# Patient Record
Sex: MALE | Race: WHITE | NOT HISPANIC OR LATINO
[De-identification: names, ages, dates, MRNs, and addresses within clinical notes are randomized per-mention and may not be internally consistent; named-entity substitution may affect disease eponyms.]

---

## 2018-02-02 ENCOUNTER — TRANSCRIPTION ENCOUNTER (OUTPATIENT)
Age: 58
End: 2018-02-02

## 2018-08-14 ENCOUNTER — APPOINTMENT (OUTPATIENT)
Dept: UROLOGY | Facility: CLINIC | Age: 58
End: 2018-08-14

## 2019-05-03 ENCOUNTER — RESULT REVIEW (OUTPATIENT)
Age: 59
End: 2019-05-03

## 2019-05-03 ENCOUNTER — APPOINTMENT (OUTPATIENT)
Dept: ULTRASOUND IMAGING | Facility: IMAGING CENTER | Age: 59
End: 2019-05-03
Payer: COMMERCIAL

## 2019-05-03 ENCOUNTER — OUTPATIENT (OUTPATIENT)
Dept: OUTPATIENT SERVICES | Facility: HOSPITAL | Age: 59
LOS: 1 days | End: 2019-05-03
Payer: COMMERCIAL

## 2019-05-03 DIAGNOSIS — Z00.8 ENCOUNTER FOR OTHER GENERAL EXAMINATION: ICD-10-CM

## 2019-05-03 PROCEDURE — 10006 FNA BX W/US GDN EA ADDL: CPT

## 2019-05-03 PROCEDURE — 88172 CYTP DX EVAL FNA 1ST EA SITE: CPT

## 2019-05-03 PROCEDURE — 10005 FNA BX W/US GDN 1ST LES: CPT

## 2019-05-03 PROCEDURE — 88173 CYTOPATH EVAL FNA REPORT: CPT

## 2019-05-03 PROCEDURE — 88173 CYTOPATH EVAL FNA REPORT: CPT | Mod: 26

## 2019-05-07 ENCOUNTER — APPOINTMENT (OUTPATIENT)
Dept: OTOLARYNGOLOGY | Facility: CLINIC | Age: 59
End: 2019-05-07

## 2020-04-27 ENCOUNTER — APPOINTMENT (OUTPATIENT)
Dept: PULMONOLOGY | Facility: CLINIC | Age: 60
End: 2020-04-27
Payer: COMMERCIAL

## 2020-04-27 PROCEDURE — 99213 OFFICE O/P EST LOW 20 MIN: CPT | Mod: 95

## 2020-04-27 NOTE — ASSESSMENT
[FreeTextEntry1] : Need to verify patient's ongoing compliance-have asked patient to mail compliance card.\par It would appear he needs a new CPAP machine which he qualifies for regardless of whether the existing machine is nonfunctional or not as his current machine is quite old\par He should not require a new sleep study at this point but I will arrange one if his insurance insists upon it. I can provide him with a loaner if necessary once I have verified his compliance and pressure requirement

## 2020-04-27 NOTE — REASON FOR VISIT
[Follow-Up] : a follow-up visit [Sleep Apnea] : sleep apnea [Home] : at home, [unfilled] , at the time of the visit. [Medical Office: ([facility])___] : at the medical office located in  [Patient] : the patient [Self] : self

## 2020-11-27 DIAGNOSIS — Z01.818 ENCOUNTER FOR OTHER PREPROCEDURAL EXAMINATION: ICD-10-CM

## 2020-12-01 ENCOUNTER — APPOINTMENT (OUTPATIENT)
Dept: DISASTER EMERGENCY | Facility: CLINIC | Age: 60
End: 2020-12-01

## 2020-12-02 ENCOUNTER — OUTPATIENT (OUTPATIENT)
Dept: OUTPATIENT SERVICES | Facility: HOSPITAL | Age: 60
LOS: 1 days | Discharge: HOME | End: 2020-12-02
Payer: COMMERCIAL

## 2020-12-02 VITALS
HEART RATE: 79 BPM | SYSTOLIC BLOOD PRESSURE: 138 MMHG | RESPIRATION RATE: 16 BRPM | DIASTOLIC BLOOD PRESSURE: 61 MMHG | OXYGEN SATURATION: 99 %

## 2020-12-02 VITALS
SYSTOLIC BLOOD PRESSURE: 146 MMHG | HEIGHT: 71 IN | HEART RATE: 81 BPM | DIASTOLIC BLOOD PRESSURE: 72 MMHG | RESPIRATION RATE: 17 BRPM | TEMPERATURE: 99 F | OXYGEN SATURATION: 97 % | WEIGHT: 250 LBS

## 2020-12-02 DIAGNOSIS — R94.39 ABNORMAL RESULT OF OTHER CARDIOVASCULAR FUNCTION STUDY: ICD-10-CM

## 2020-12-02 DIAGNOSIS — Z98.890 OTHER SPECIFIED POSTPROCEDURAL STATES: Chronic | ICD-10-CM

## 2020-12-02 DIAGNOSIS — Z98.1 ARTHRODESIS STATUS: Chronic | ICD-10-CM

## 2020-12-02 LAB
ANION GAP SERPL CALC-SCNC: 12 MMOL/L — SIGNIFICANT CHANGE UP (ref 5–17)
BUN SERPL-MCNC: 16 MG/DL — SIGNIFICANT CHANGE UP (ref 7–23)
CALCIUM SERPL-MCNC: 9.5 MG/DL — SIGNIFICANT CHANGE UP (ref 8.4–10.5)
CHLORIDE SERPL-SCNC: 103 MMOL/L — SIGNIFICANT CHANGE UP (ref 96–108)
CO2 SERPL-SCNC: 24 MMOL/L — SIGNIFICANT CHANGE UP (ref 22–31)
CREAT SERPL-MCNC: 0.92 MG/DL — SIGNIFICANT CHANGE UP (ref 0.5–1.3)
GLUCOSE SERPL-MCNC: 176 MG/DL — HIGH (ref 70–99)
HCT VFR BLD CALC: 35.5 % — LOW (ref 39–50)
HGB BLD-MCNC: 12.1 G/DL — LOW (ref 13–17)
MCHC RBC-ENTMCNC: 30.3 PG — SIGNIFICANT CHANGE UP (ref 27–34)
MCHC RBC-ENTMCNC: 34.1 GM/DL — SIGNIFICANT CHANGE UP (ref 32–36)
MCV RBC AUTO: 88.8 FL — SIGNIFICANT CHANGE UP (ref 80–100)
NRBC # BLD: 0 /100 WBCS — SIGNIFICANT CHANGE UP (ref 0–0)
PLATELET # BLD AUTO: 170 K/UL — SIGNIFICANT CHANGE UP (ref 150–400)
POTASSIUM SERPL-MCNC: 4.4 MMOL/L — SIGNIFICANT CHANGE UP (ref 3.5–5.3)
POTASSIUM SERPL-SCNC: 4.4 MMOL/L — SIGNIFICANT CHANGE UP (ref 3.5–5.3)
RBC # BLD: 4 M/UL — LOW (ref 4.2–5.8)
RBC # FLD: 12.8 % — SIGNIFICANT CHANGE UP (ref 10.3–14.5)
SARS-COV-2 N GENE NPH QL NAA+PROBE: NOT DETECTED
SODIUM SERPL-SCNC: 139 MMOL/L — SIGNIFICANT CHANGE UP (ref 135–145)
WBC # BLD: 5.7 K/UL — SIGNIFICANT CHANGE UP (ref 3.8–10.5)
WBC # FLD AUTO: 5.7 K/UL — SIGNIFICANT CHANGE UP (ref 3.8–10.5)

## 2020-12-02 PROCEDURE — 93567 NJX CAR CTH SPRVLV AORTGRPHY: CPT

## 2020-12-02 PROCEDURE — 93458 L HRT ARTERY/VENTRICLE ANGIO: CPT

## 2020-12-02 PROCEDURE — C1894: CPT

## 2020-12-02 PROCEDURE — 99153 MOD SED SAME PHYS/QHP EA: CPT

## 2020-12-02 PROCEDURE — 85027 COMPLETE CBC AUTOMATED: CPT

## 2020-12-02 PROCEDURE — 99152 MOD SED SAME PHYS/QHP 5/>YRS: CPT

## 2020-12-02 PROCEDURE — C1769: CPT

## 2020-12-02 PROCEDURE — C1887: CPT

## 2020-12-02 PROCEDURE — 93005 ELECTROCARDIOGRAM TRACING: CPT

## 2020-12-02 PROCEDURE — 93010 ELECTROCARDIOGRAM REPORT: CPT

## 2020-12-02 PROCEDURE — 80048 BASIC METABOLIC PNL TOTAL CA: CPT

## 2020-12-02 PROCEDURE — 93458 L HRT ARTERY/VENTRICLE ANGIO: CPT | Mod: 26,59

## 2020-12-02 NOTE — ASU DISCHARGE PLAN (ADULT/PEDIATRIC) - CALL YOUR DOCTOR IF YOU HAVE ANY OF THE FOLLOWING:
Wound/Surgical Site with redness, or foul smelling discharge or pus/Bleeding that does not stop/Fever greater than (need to indicate Fahrenheit or Celsius)

## 2020-12-02 NOTE — ASU DISCHARGE PLAN (ADULT/PEDIATRIC) - ASU DC SPECIAL INSTRUCTIONSFT
No heavy lifting or pushing/pulling with procedure arm for 2 weeks. No driving for 2 days. You may shower 24 hours following the procedure but avoid baths/swimming for 1 week. Check your wrist site for bleeding and/or swelling daily following procedure and call your doctor immediately if it occurs or if you experience increased pain at the site. Follow up with your cardiologist in 1-2 weeks. You may call [facility] if you have any questions/concerns regarding your procedure [phone].

## 2020-12-02 NOTE — ASU DISCHARGE PLAN (ADULT/PEDIATRIC) - CARE PROVIDER_API CALL
Mathew Fermin J  CARDIOVASCULAR DISEASE  [id number] [address] [place] [address]  Phone: [phone]  Fax: [phone]  Follow Up Time:

## 2020-12-02 NOTE — H&P CARDIOLOGY - HISTORY OF PRESENT ILLNESS
60 y/o M with Pmxh of       11/16/20 ST: Defect in the inferior segment is reversible. EF 61%, hypokinesis in the inferior segments. Preserved left ventricular systolic function. 60 y/o M with Pmxh of borderline DM (diet controlled) sent her by Dr. Fermin for angiogram due to routine (+) ST done on his annual physical. Pt not on any medications. No known medical problems. Denies CP, SOB, dizziness, lightheadedness, or fevers      11/16/20 ST: Defect in the inferior segment is reversible. EF 61%, hypokinesis in the inferior segments. Preserved left ventricular systolic function.

## 2020-12-03 PROBLEM — R73.03 PREDIABETES: Chronic | Status: ACTIVE | Noted: 2020-12-02

## 2021-03-18 ENCOUNTER — TRANSCRIPTION ENCOUNTER (OUTPATIENT)
Age: 61
End: 2021-03-18

## 2022-01-22 ENCOUNTER — NON-APPOINTMENT (OUTPATIENT)
Age: 62
End: 2022-01-22

## 2022-01-26 ENCOUNTER — APPOINTMENT (OUTPATIENT)
Dept: UROLOGY | Facility: CLINIC | Age: 62
End: 2022-01-26
Payer: COMMERCIAL

## 2022-01-26 ENCOUNTER — NON-APPOINTMENT (OUTPATIENT)
Age: 62
End: 2022-01-26

## 2022-01-26 VITALS — TEMPERATURE: 96.4 F

## 2022-01-26 DIAGNOSIS — E11.9 TYPE 2 DIABETES MELLITUS W/OUT COMPLICATIONS: ICD-10-CM

## 2022-01-26 PROCEDURE — 99204 OFFICE O/P NEW MOD 45 MIN: CPT

## 2022-01-26 RX ORDER — TADALAFIL 10 MG/1
10 TABLET, FILM COATED ORAL
Qty: 10 | Refills: 0 | Status: ACTIVE | COMMUNITY
Start: 2022-01-26 | End: 1900-01-01

## 2022-01-26 NOTE — ASSESSMENT
[FreeTextEntry1] : 62 yo male with DM2 presenting for initial visit for erectile dysfunction. \par \par -- Discussed physiology of erections and pathophysiology of erectile dysfunction. \par -We had an extensive discussion regarding possible management options for erectile dysfunction, including PDE 5 inhibitors, BRAD, ICI, intraurethral alprostadil, penile prosthesis\par -After a thorough discussion of the risks and benefits of the aforementioned options he would like to try a trial of a PDE 5 inhibitor\par -- Trial if Cialis 10 mg\par I discussed the risks, benefits, alternatives, and possible side effects of Cialis (tadalafil) therapy with the patient, including but not limited to headache, flushing, upset stomach, blurry vision, change in color vision, vision loss, and priapism with the patient.\par -- RTC 1 month  \par

## 2022-01-26 NOTE — HISTORY OF PRESENT ILLNESS
[FreeTextEntry1] : Very pleasant 61 year old male with DM2 presenting for initial visit for erectile dysfunction. States he has not been able to have sexual intercourse with his wife for many years (10 years) and is now seeking evaluation because he is frustrated. Referred by Dr. Paz Costello. States he had DM2, was able to reverse with diet and exercise. However, December 2021 A1c 7, which is the highest it has been and states it was an outlier from the holidays. Denies CAD, PVD, smoking, SOB or claudication. He has normal libido and would like to engage in intercourse with his wife, but is unable to have any erection (0/10 strength). He is able to have orgasm and ejaculation. He is able to masturbate and has an erection with self stimulation. Denies nocturnal tumescence. Has not tried any PDE-5 inhibitors, his wife is concerned about side effects. He has has multiple spine surgeries but denies pain, weakness or numbness. \par \par PMH: DM2 controlled with diet and exercise \par PSHx: lumbar spinal fusions, lumbar microdiscectomy \par Denies medications\par Denies allergies \par Denies tobacco and alcohol

## 2022-01-26 NOTE — REVIEW OF SYSTEMS
[Poor quality erections] : Poor quality erections [No erections] : no erections [Negative] : Heme/Lymph [see HPI] : see HPI [FreeTextEntry4] : Erectile dysfunction

## 2022-02-15 RX ORDER — TADALAFIL 20 MG/1
20 TABLET ORAL
Qty: 10 | Refills: 0 | Status: ACTIVE | COMMUNITY
Start: 2022-02-15 | End: 1900-01-01

## 2022-02-17 ENCOUNTER — APPOINTMENT (OUTPATIENT)
Dept: UROLOGY | Facility: CLINIC | Age: 62
End: 2022-02-17

## 2022-03-01 ENCOUNTER — APPOINTMENT (OUTPATIENT)
Dept: UROLOGY | Facility: CLINIC | Age: 62
End: 2022-03-01
Payer: COMMERCIAL

## 2022-03-01 DIAGNOSIS — N52.9 MALE ERECTILE DYSFUNCTION, UNSPECIFIED: ICD-10-CM

## 2022-03-01 PROCEDURE — 99213 OFFICE O/P EST LOW 20 MIN: CPT

## 2022-03-01 NOTE — HISTORY OF PRESENT ILLNESS
[FreeTextEntry1] : Very pleasant 61-year-old gentleman who presents for follow-up of erectile dysfunction.  He reports an improvement in his erections with Cialis 20 mg.  Whereas before he reports no erections at all, after taking Cialis 20 mg he reports rigidity and tumescence of approximately 5 out of 10.  He reports that this is not strong enough for vaginal penetration, however he is encouraged by the improvement in his erections at this time.

## 2022-03-01 NOTE — ASSESSMENT
[FreeTextEntry1] : Very pleasant 61-year-old gentleman who presents for follow-up of erectile dysfunction\par -We had an extensive discussion regarding possible management options for erectile dysfunction, including PDE 5 inhibitors, BRAD, ICI, intraurethral alprostadil, penile prosthesis\par -After a thorough discussion of the risks and benefits of the aforementioned options he would like to continue Cialis 20 mg to try to give it more time to work\par -Follow-up in approximately 1 to 2 months.\par -At this time he is not interested in intracavernosal injections

## 2022-04-06 RX ORDER — SILDENAFIL 100 MG/1
100 TABLET, FILM COATED ORAL
Qty: 10 | Refills: 3 | Status: ACTIVE | COMMUNITY
Start: 2022-04-06 | End: 1900-01-01

## 2022-05-02 ENCOUNTER — APPOINTMENT (OUTPATIENT)
Dept: UROLOGY | Facility: CLINIC | Age: 62
End: 2022-05-02

## 2023-01-26 ENCOUNTER — APPOINTMENT (OUTPATIENT)
Dept: PULMONOLOGY | Facility: CLINIC | Age: 63
End: 2023-01-26
Payer: COMMERCIAL

## 2023-01-26 VITALS
WEIGHT: 246 LBS | DIASTOLIC BLOOD PRESSURE: 75 MMHG | BODY MASS INDEX: 35.22 KG/M2 | HEIGHT: 70 IN | OXYGEN SATURATION: 94 % | HEART RATE: 92 BPM | SYSTOLIC BLOOD PRESSURE: 144 MMHG

## 2023-01-26 PROCEDURE — 99213 OFFICE O/P EST LOW 20 MIN: CPT

## 2023-01-26 NOTE — ADDENDUM
[FreeTextEntry1] : I, Sekou Gonzalesdoe, acted solely as a scribe for Dr. Brenden Argueta M.D. on this date 01/26/2023. \par \par All medical record entries made by the Scribe were at my, Dr. Brenden Argueta M.D., direction and personally dictated by me on 01/26/2023. I have reviewed the chart and agree that the record accurately reflects my personal performance of the history, physical exam, assessment and plan. I have also personally directed, reviewed, and agreed with the chart.

## 2023-01-26 NOTE — ASSESSMENT
[FreeTextEntry1] : Mr. NICOLE DAVIS is an 62 year old male with Obstructive sleep apnea. Auto-titrating Positive Airway Pressure(APAP) compliance reviewed with patient in office today. Patient is compliant and benefiting from APAP usage. I have advised him to wait until after insurance switch to medicare when he turns 63 to receive a new home sleep study for an updated sleep apnea evaluation.

## 2023-01-26 NOTE — PROCEDURE
[FreeTextEntry1] : Compliance Report\par Usage 12/27/2022 - 01/25/2023\par Usage days 30/30 days (100%)\par >= 4 hours 30 days (100%)\par < 4 hours 0 days (0%)\par Usage hours 216 hours 55 minutes\par Average usage (total days) 7 hours 14 minutes\par Average usage (days used) 7 hours 14 minutes\par Median usage (days used) 7 hours 17 minutes\par S9 AutoSet\par Serial number [id number]\par Mode AutoSet\par Min Pressure 5 cmH2O\par Max Pressure 15 cmH2O\par EPR Fulltime\par EPR level 2\par Therapy\par Pressure - cmH2O Median: 6.6 95th percentile: 11.1 Maximum: 12.7\par Leaks - L/min Median: 0.0 95th percentile: 6.5 Maximum: 33.1\par Events per hour AI: 0.3 HI: 0.2 AHI: 0.5\par Apnea Index Central: 0.0 Obstructive: 0.2 Unknown: 0.1

## 2023-06-12 ENCOUNTER — INPATIENT (INPATIENT)
Facility: HOSPITAL | Age: 63
LOS: 0 days | Discharge: HOME | DRG: 563 | End: 2023-06-13
Attending: INTERNAL MEDICINE | Admitting: INTERNAL MEDICINE
Payer: COMMERCIAL

## 2023-06-12 VITALS
TEMPERATURE: 98 F | RESPIRATION RATE: 18 BRPM | OXYGEN SATURATION: 97 % | SYSTOLIC BLOOD PRESSURE: 122 MMHG | WEIGHT: 244.05 LBS | HEIGHT: 71 IN | HEART RATE: 90 BPM | DIASTOLIC BLOOD PRESSURE: 76 MMHG

## 2023-06-12 DIAGNOSIS — S82.009A UNSPECIFIED FRACTURE OF UNSPECIFIED PATELLA, INITIAL ENCOUNTER FOR CLOSED FRACTURE: ICD-10-CM

## 2023-06-12 DIAGNOSIS — Z98.1 ARTHRODESIS STATUS: Chronic | ICD-10-CM

## 2023-06-12 DIAGNOSIS — Z98.890 OTHER SPECIFIED POSTPROCEDURAL STATES: Chronic | ICD-10-CM

## 2023-06-12 LAB
ALBUMIN SERPL ELPH-MCNC: 4.4 G/DL — SIGNIFICANT CHANGE UP (ref 3.3–5)
ALP SERPL-CCNC: 86 U/L — SIGNIFICANT CHANGE UP (ref 40–120)
ALT FLD-CCNC: 42 U/L — SIGNIFICANT CHANGE UP (ref 10–45)
ANION GAP SERPL CALC-SCNC: 14 MMOL/L — SIGNIFICANT CHANGE UP (ref 5–17)
APTT BLD: 29 SEC — SIGNIFICANT CHANGE UP (ref 27.5–35.5)
AST SERPL-CCNC: 29 U/L — SIGNIFICANT CHANGE UP (ref 10–40)
BASOPHILS # BLD AUTO: 0.04 K/UL — SIGNIFICANT CHANGE UP (ref 0–0.2)
BASOPHILS NFR BLD AUTO: 0.6 % — SIGNIFICANT CHANGE UP (ref 0–2)
BILIRUB SERPL-MCNC: 0.4 MG/DL — SIGNIFICANT CHANGE UP (ref 0.2–1.2)
BUN SERPL-MCNC: 19 MG/DL — SIGNIFICANT CHANGE UP (ref 7–23)
CALCIUM SERPL-MCNC: 9.2 MG/DL — SIGNIFICANT CHANGE UP (ref 8.4–10.5)
CHLORIDE SERPL-SCNC: 99 MMOL/L — SIGNIFICANT CHANGE UP (ref 96–108)
CO2 SERPL-SCNC: 23 MMOL/L — SIGNIFICANT CHANGE UP (ref 22–31)
CREAT SERPL-MCNC: 0.7 MG/DL — SIGNIFICANT CHANGE UP (ref 0.5–1.3)
EGFR: 103 ML/MIN/1.73M2 — SIGNIFICANT CHANGE UP
EOSINOPHIL # BLD AUTO: 0.17 K/UL — SIGNIFICANT CHANGE UP (ref 0–0.5)
EOSINOPHIL NFR BLD AUTO: 2.6 % — SIGNIFICANT CHANGE UP (ref 0–6)
GLUCOSE SERPL-MCNC: 277 MG/DL — HIGH (ref 70–99)
HCT VFR BLD CALC: 35.6 % — LOW (ref 39–50)
HGB BLD-MCNC: 12.6 G/DL — LOW (ref 13–17)
IMM GRANULOCYTES NFR BLD AUTO: 0.3 % — SIGNIFICANT CHANGE UP (ref 0–0.9)
INR BLD: 1.09 RATIO — SIGNIFICANT CHANGE UP (ref 0.88–1.16)
LYMPHOCYTES # BLD AUTO: 1.7 K/UL — SIGNIFICANT CHANGE UP (ref 1–3.3)
LYMPHOCYTES # BLD AUTO: 26.2 % — SIGNIFICANT CHANGE UP (ref 13–44)
MCHC RBC-ENTMCNC: 30.2 PG — SIGNIFICANT CHANGE UP (ref 27–34)
MCHC RBC-ENTMCNC: 35.4 GM/DL — SIGNIFICANT CHANGE UP (ref 32–36)
MCV RBC AUTO: 85.4 FL — SIGNIFICANT CHANGE UP (ref 80–100)
MONOCYTES # BLD AUTO: 0.51 K/UL — SIGNIFICANT CHANGE UP (ref 0–0.9)
MONOCYTES NFR BLD AUTO: 7.9 % — SIGNIFICANT CHANGE UP (ref 2–14)
NEUTROPHILS # BLD AUTO: 4.04 K/UL — SIGNIFICANT CHANGE UP (ref 1.8–7.4)
NEUTROPHILS NFR BLD AUTO: 62.4 % — SIGNIFICANT CHANGE UP (ref 43–77)
NRBC # BLD: 0 /100 WBCS — SIGNIFICANT CHANGE UP (ref 0–0)
PLATELET # BLD AUTO: 171 K/UL — SIGNIFICANT CHANGE UP (ref 150–400)
POTASSIUM SERPL-MCNC: 4.2 MMOL/L — SIGNIFICANT CHANGE UP (ref 3.5–5.3)
POTASSIUM SERPL-SCNC: 4.2 MMOL/L — SIGNIFICANT CHANGE UP (ref 3.5–5.3)
PROT SERPL-MCNC: 7.5 G/DL — SIGNIFICANT CHANGE UP (ref 6–8.3)
PROTHROM AB SERPL-ACNC: 12.6 SEC — SIGNIFICANT CHANGE UP (ref 10.5–13.4)
RBC # BLD: 4.17 M/UL — LOW (ref 4.2–5.8)
RBC # FLD: 12.8 % — SIGNIFICANT CHANGE UP (ref 10.3–14.5)
SODIUM SERPL-SCNC: 136 MMOL/L — SIGNIFICANT CHANGE UP (ref 135–145)
WBC # BLD: 6.48 K/UL — SIGNIFICANT CHANGE UP (ref 3.8–10.5)
WBC # FLD AUTO: 6.48 K/UL — SIGNIFICANT CHANGE UP (ref 3.8–10.5)

## 2023-06-12 PROCEDURE — 73110 X-RAY EXAM OF WRIST: CPT | Mod: 26,LT

## 2023-06-12 PROCEDURE — 73130 X-RAY EXAM OF HAND: CPT | Mod: 26,LT

## 2023-06-12 PROCEDURE — 73564 X-RAY EXAM KNEE 4 OR MORE: CPT | Mod: 26,RT

## 2023-06-12 PROCEDURE — 73590 X-RAY EXAM OF LOWER LEG: CPT | Mod: 26,RT

## 2023-06-12 PROCEDURE — 73502 X-RAY EXAM HIP UNI 2-3 VIEWS: CPT | Mod: 26,RT

## 2023-06-12 PROCEDURE — 73552 X-RAY EXAM OF FEMUR 2/>: CPT | Mod: 26,RT

## 2023-06-12 PROCEDURE — 29125 APPL SHORT ARM SPLINT STATIC: CPT

## 2023-06-12 PROCEDURE — 99285 EMERGENCY DEPT VISIT HI MDM: CPT | Mod: 25

## 2023-06-12 RX ORDER — ACETAMINOPHEN 500 MG
1000 TABLET ORAL ONCE
Refills: 0 | Status: COMPLETED | OUTPATIENT
Start: 2023-06-12 | End: 2023-06-12

## 2023-06-12 RX ORDER — ACETAMINOPHEN 500 MG
975 TABLET ORAL ONCE
Refills: 0 | Status: COMPLETED | OUTPATIENT
Start: 2023-06-12 | End: 2023-06-12

## 2023-06-12 RX ORDER — TETANUS TOXOID, REDUCED DIPHTHERIA TOXOID AND ACELLULAR PERTUSSIS VACCINE, ADSORBED 5; 2.5; 8; 8; 2.5 [IU]/.5ML; [IU]/.5ML; UG/.5ML; UG/.5ML; UG/.5ML
0.5 SUSPENSION INTRAMUSCULAR ONCE
Refills: 0 | Status: COMPLETED | OUTPATIENT
Start: 2023-06-12 | End: 2023-06-12

## 2023-06-12 RX ORDER — IBUPROFEN 200 MG
600 TABLET ORAL ONCE
Refills: 0 | Status: COMPLETED | OUTPATIENT
Start: 2023-06-12 | End: 2023-06-12

## 2023-06-12 RX ADMIN — TETANUS TOXOID, REDUCED DIPHTHERIA TOXOID AND ACELLULAR PERTUSSIS VACCINE, ADSORBED 0.5 MILLILITER(S): 5; 2.5; 8; 8; 2.5 SUSPENSION INTRAMUSCULAR at 17:13

## 2023-06-12 RX ADMIN — Medication 400 MILLIGRAM(S): at 20:04

## 2023-06-12 RX ADMIN — Medication 600 MILLIGRAM(S): at 17:13

## 2023-06-12 NOTE — ED ADULT NURSE NOTE - NSFALLRISKINTERV_ED_ALL_ED

## 2023-06-12 NOTE — ED PROVIDER NOTE - ATTENDING APP SHARED VISIT CONTRIBUTION OF CARE
Attending MD Craft:   I personally have seen and examined this patient.  Physician assistant note reviewed and agree on plan of care and except where noted.  See below for details.     Seen in [place] accompanied by wife    62M with PMH/PSH including diet controlled DM, extensive lumbar surgeries presents to the ED with R knee and L wrist/hand pain s/p fall.  Reports was out for a walk as doctor recommended exercise and tripped and fell on uneven pavement.  Reports heard "crack" at knee at time of fall and since has been unable to bear weight.  Reports landed on R knee and L fall onto outstretched hand.  Denies preceding dizziness, weakness, sensory changes.  Denies LOC, hitting head.  Denies chest pain, shortness of breath, abdominal pain, nausea, vomiting, diarrhea, urinary complaints, change in vision, loss of vision.  Denies neck or back pain.  Denies loss of urinary or bowel continence. Denies numbness, weakness or tingling in extremities.     Exam:   General: NAD  HENT: head NCAT, airway patent  Eyes: anicteric, no conjunctival injection   Lungs: lungs CTAB with good inspiratory effort, no wheezing, no rhonchi, no rales  Cardiac: +S1S2, no obvious m/r/g  GI: abdomen soft with +BS, NT, ND  : no CVAT  MSK: ranging neck freely, UEs and LLEs freely, FROM at L shoulder, L elbow, +tenderness to palpation at L anatomical snuffbox and with axial loading of L thumb, +2 radials and DPs, R hip/ankle/foot nontender, able to fully range passively, +R knee edema with tenderness diffusely, small abrasion overlying, limited ROM secondary to pain, no midline tenderness to palpation of spine, well healed lower spinal surgical incision  Neuro: moving all extremities spontaneously, nonfocal  Psych: normal mood and affect     A/P: 62M with L wrist pain, concern for scaphoid injury, explained would need splint regardless of XR findings, R knee will evaluate for bony injury, will obtain XR RLE, will give analgesia, will reassess, may need CT

## 2023-06-12 NOTE — ED PROVIDER NOTE - PHYSICAL EXAMINATION
A&Ox3, NAD, well appearing  Extremities: R knee effusion with overlaying superficial abrasion overlaying patella, no FB or bleeding, unable to extend at R knee 2/2 pain. cap refill <2, pulses in distal extremities 4+, no edema.   + tt of the L thenar eminence and + snuff box ttp, + decreased  strength 2/2 pain in the hand, sensations intact throughout.   Skin without rash.   No focal Deficits A&Ox3, NAD, well appearing  NCAT. PERRL, EOMI.  Neck supple, no vertebral ttp of the c spine  Lungs CTAB. No w/r/r  Cardiac , RRR, No m/r/g.   Abd soft, NT/ND, no rebound or guarding.    Extremities: R knee effusion with overlaying superficial abrasion overlaying patella, no FB or bleeding, unable to extend at R knee 2/2 pain. cap refill <2, pulses in distal extremities 4+, no edema.   + tt of the L thenar eminence and + snuff box ttp, + decreased  strength 2/2 pain in the hand, sensations intact throughout.   Skin without rash.   No focal Deficits

## 2023-06-12 NOTE — ED PROVIDER NOTE - NS ED ATTENDING STATEMENT MOD
This was a shared visit with the SHERIE. I reviewed and verified the documentation and independently performed the documented:

## 2023-06-12 NOTE — ED ADULT TRIAGE NOTE - CHIEF COMPLAINT QUOTE
RLE pain and abrasion, reports mechanical fall while walking today on uneven sidewalk, denies hitting head

## 2023-06-12 NOTE — ED ADULT NURSE NOTE - OBJECTIVE STATEMENT
63yo M pmh T2DM presents to ED from home with wife present at the bedside, presenting with c/o R knee pain and swelling s/p mechanical fall this morning. pt reports tripping and falling over the sidewalk at 11:30 this morning while on a walk for exercise, fell onto his R knee and L hand. Pt denies head strike or LOC. pt now endorsing 10/10 pain to the R knee with associated difficulty ambulating d/t pain, as well as pain to the L wrist. pt denies any cp, sob, headache, numbness, tingling, nausea or vomiting. on exam in ED pt is generally well-appearing in NAD, awake and alert, A&Ox4, breathing even and unlabored, clear coherent speech, vital signs stable. + swelling noted to R knee, + abrasion to R anterior knee without active bleeding. pt seen and eval by ED MD and PA. pt medicated for pain per orders. pending X-Ray. Plan of care discussed.

## 2023-06-12 NOTE — ED PROVIDER NOTE - OBJECTIVE STATEMENT
62-year-old male with past medical history of diet-controlled diabetes, here for evaluation of right knee and left wrist pain after mechanical fall this afternoon.  Patient states he was out for a walk and tripped on a curb, fell onto directly onto his right knee and his left wrist.  Denies any head strike or loss of consciousness.  Was unable to bear weight on the right lower extremity after the event.  Did not take anything for pain afterwards.  Denies any numbness, tingling, paresthesias.

## 2023-06-12 NOTE — CONSULT NOTE ADULT - SUBJECTIVE AND OBJECTIVE BOX
62y Male presents with right knee pain s/p mechanical fall today. He was walking outside when he tripped and fell landing directly onto R flexed knee. Denies numbness/tingling in the affected extremity. Denies head strike/LOC. Reports L hand pain, Patient ambulates without assistance at baseline.     PAST MEDICAL & SURGICAL HISTORY:  Borderline diabetic  diet controlled      H/O spinal fusion  lumbar spinal fusion  spinal fixation device  discectomy      H/O discectomy        Home Medications:    Allergies    No Known Allergies    Intolerances                              12.6   6.48  )-----------( 171      ( 12 Jun 2023 18:15 )             35.6     06-12    136  |  99  |  19  ----------------------------<  277<H>  4.2   |  23  |  0.70    Ca    9.2      12 Jun 2023 18:15    TPro  7.5  /  Alb  4.4  /  TBili  0.4  /  DBili  x   /  AST  29  /  ALT  42  /  AlkPhos  86  06-12    PT/INR - ( 12 Jun 2023 18:15 )   PT: 12.6 sec;   INR: 1.09 ratio         PTT - ( 12 Jun 2023 18:15 )  PTT:29.0 sec        Vital Signs Last 24 Hrs  T(C): 36.7 (13 Jun 2023 01:41), Max: 37 (13 Jun 2023 00:26)  T(F): 98.1 (13 Jun 2023 01:41), Max: 98.6 (13 Jun 2023 00:26)  HR: 75 (13 Jun 2023 01:41) (75 - 90)  BP: 132/82 (13 Jun 2023 01:41) (122/76 - 142/78)  BP(mean): --  RR: 18 (13 Jun 2023 01:41) (17 - 18)  SpO2: 97% (13 Jun 2023 01:41) (95% - 97%)    Parameters below as of 13 Jun 2023 01:41  Patient On (Oxygen Delivery Method): room air        PHYSICAL EXAM  General: NAD, Awake and Alert    RLE:  Superficial abrasion above R patella. Skin otherwise intact  TTP over distal thigh and patella  NTTP tibial tubercle   NTTP over the bony prominences of the hip/ankle/foot/toes  L2-S1 SILT   Able to SLR  +EHL/FHL/TA/GSC  +DP pulses  Calf nontender  Compartments soft and compressible  Negative log roll, no tenderness w axial loading    LLE, BUE: No deformities, NTTP bony prominences, WWP distally,      IMAGING:  XR R knee: Comminuted fx of R patella        Assessment/Plan:  62y Male with R comminuted fx of patella    -NPO for possible procedure  -Pre-op labs  -Pain control as needed  -NWB RLE in bulky galeana knee immobilizer  -CT R knee for pre-op planning  -DVT ppx: Please hold all chemical dvt ppx for OR   -Needs medical optimization for OR, please document      For all questions related to patient care, please reach out via the on-call pager.    Shani Haider, PGY-1  Orthopedic Surgery  [facility]: [phone]  [facility]: [id number]  [facility]: [phone]

## 2023-06-12 NOTE — CONSULT NOTE ADULT - ATTENDING COMMENTS
Patient examined. Chart and X-rays reviewed. Agree with above note.    Alfonso Devries MD Patient examined. Chart and X-rays reviewed. Patient is able to actively extend the RLE against gravity. X-rays including CT images reveal evidence of comminution of Right patella fracture. The diastasis and step-off is less than 2-3 mm on all views. At this time my recommendation is non-operative treatment.  - No flexion at the Right knee joint.  - RLE Weinert knee brace locked at full extension.  - WBAT RLE.  - DVT prophylaxis with Xarelto or Eliquis.  - Orthopaedic office follow-up in 10-14 days. Call [phone] for appointment.  Alfonso Devries MD

## 2023-06-12 NOTE — ED PROCEDURE NOTE - PROCEDURE NAME, MLM
Splint [Dear  ___] : Dear  [unfilled], [Courtesy Letter:] : I had the pleasure of seeing your patient, [unfilled], in my office today. [Please see my note below.] : Please see my note below. [Consult Closing:] : Thank you very much for allowing me to participate in the care of this patient.  If you have any questions, please do not hesitate to contact me. [Sincerely,] : Sincerely, [FreeTextEntry3] : Brain Mederos MD\par Associate Professor of Medicine\par Chief of GI\par Director IBD Program\par [facility]\par

## 2023-06-13 ENCOUNTER — TRANSCRIPTION ENCOUNTER (OUTPATIENT)
Age: 63
End: 2023-06-13

## 2023-06-13 VITALS
RESPIRATION RATE: 18 BRPM | SYSTOLIC BLOOD PRESSURE: 126 MMHG | OXYGEN SATURATION: 94 % | HEART RATE: 64 BPM | DIASTOLIC BLOOD PRESSURE: 67 MMHG | TEMPERATURE: 98 F

## 2023-06-13 DIAGNOSIS — N52.9 MALE ERECTILE DYSFUNCTION, UNSPECIFIED: ICD-10-CM

## 2023-06-13 DIAGNOSIS — G47.33 OBSTRUCTIVE SLEEP APNEA (ADULT) (PEDIATRIC): ICD-10-CM

## 2023-06-13 DIAGNOSIS — M25.532 PAIN IN LEFT WRIST: ICD-10-CM

## 2023-06-13 DIAGNOSIS — E78.5 HYPERLIPIDEMIA, UNSPECIFIED: ICD-10-CM

## 2023-06-13 DIAGNOSIS — R73.03 PREDIABETES: ICD-10-CM

## 2023-06-13 DIAGNOSIS — I10 ESSENTIAL (PRIMARY) HYPERTENSION: ICD-10-CM
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
## 2023-06-13 DIAGNOSIS — S82.009A UNSPECIFIED FRACTURE OF UNSPECIFIED PATELLA, INITIAL ENCOUNTER FOR CLOSED FRACTURE: ICD-10-CM

## 2023-06-13 LAB
A1C WITH ESTIMATED AVERAGE GLUCOSE RESULT: 9.5 % — HIGH (ref 4–5.6)
ALBUMIN SERPL ELPH-MCNC: 4.5 G/DL — SIGNIFICANT CHANGE UP (ref 3.3–5)
ALP SERPL-CCNC: 87 U/L — SIGNIFICANT CHANGE UP (ref 40–120)
ALT FLD-CCNC: 40 U/L — SIGNIFICANT CHANGE UP (ref 10–45)
ANION GAP SERPL CALC-SCNC: 10 MMOL/L — SIGNIFICANT CHANGE UP (ref 5–17)
ANION GAP SERPL CALC-SCNC: 13 MMOL/L — SIGNIFICANT CHANGE UP (ref 5–17)
APTT BLD: 27.8 SEC — SIGNIFICANT CHANGE UP (ref 27.5–35.5)
APTT BLD: 28.5 SEC — SIGNIFICANT CHANGE UP (ref 27.5–35.5)
AST SERPL-CCNC: 23 U/L — SIGNIFICANT CHANGE UP (ref 10–40)
BASOPHILS # BLD AUTO: 0.05 K/UL — SIGNIFICANT CHANGE UP (ref 0–0.2)
BASOPHILS NFR BLD AUTO: 0.8 % — SIGNIFICANT CHANGE UP (ref 0–2)
BILIRUB SERPL-MCNC: 0.6 MG/DL — SIGNIFICANT CHANGE UP (ref 0.2–1.2)
BLD GP AB SCN SERPL QL: NEGATIVE — SIGNIFICANT CHANGE UP
BUN SERPL-MCNC: 15 MG/DL — SIGNIFICANT CHANGE UP (ref 7–23)
BUN SERPL-MCNC: 16 MG/DL — SIGNIFICANT CHANGE UP (ref 7–23)
CALCIUM SERPL-MCNC: 9 MG/DL — SIGNIFICANT CHANGE UP (ref 8.4–10.5)
CALCIUM SERPL-MCNC: 9.2 MG/DL — SIGNIFICANT CHANGE UP (ref 8.4–10.5)
CHLORIDE SERPL-SCNC: 98 MMOL/L — SIGNIFICANT CHANGE UP (ref 96–108)
CHLORIDE SERPL-SCNC: 98 MMOL/L — SIGNIFICANT CHANGE UP (ref 96–108)
CHOLEST SERPL-MCNC: 166 MG/DL — SIGNIFICANT CHANGE UP
CO2 SERPL-SCNC: 24 MMOL/L — SIGNIFICANT CHANGE UP (ref 22–31)
CO2 SERPL-SCNC: 26 MMOL/L — SIGNIFICANT CHANGE UP (ref 22–31)
CREAT SERPL-MCNC: 0.68 MG/DL — SIGNIFICANT CHANGE UP (ref 0.5–1.3)
CREAT SERPL-MCNC: 0.77 MG/DL — SIGNIFICANT CHANGE UP (ref 0.5–1.3)
D DIMER BLD IA.RAPID-MCNC: 431 NG/ML DDU — HIGH
EGFR: 100 ML/MIN/1.73M2 — SIGNIFICANT CHANGE UP
EGFR: 104 ML/MIN/1.73M2 — SIGNIFICANT CHANGE UP
EOSINOPHIL # BLD AUTO: 0.24 K/UL — SIGNIFICANT CHANGE UP (ref 0–0.5)
EOSINOPHIL NFR BLD AUTO: 3.8 % — SIGNIFICANT CHANGE UP (ref 0–6)
ESTIMATED AVERAGE GLUCOSE: 226 MG/DL — HIGH (ref 68–114)
FERRITIN SERPL-MCNC: 384 NG/ML — SIGNIFICANT CHANGE UP (ref 30–400)
FOLATE SERPL-MCNC: 13.7 NG/ML — SIGNIFICANT CHANGE UP
GLUCOSE BLDC GLUCOMTR-MCNC: 280 MG/DL — HIGH (ref 70–99)
GLUCOSE BLDC GLUCOMTR-MCNC: 304 MG/DL — HIGH (ref 70–99)
GLUCOSE SERPL-MCNC: 296 MG/DL — HIGH (ref 70–99)
GLUCOSE SERPL-MCNC: 296 MG/DL — HIGH (ref 70–99)
HAPTOGLOB SERPL-MCNC: 80 MG/DL — SIGNIFICANT CHANGE UP (ref 34–200)
HCT VFR BLD CALC: 35.4 % — LOW (ref 39–50)
HCT VFR BLD CALC: 36.5 % — LOW (ref 39–50)
HDLC SERPL-MCNC: 32 MG/DL — LOW
HGB BLD-MCNC: 12.3 G/DL — LOW (ref 13–17)
HGB BLD-MCNC: 12.6 G/DL — LOW (ref 13–17)
IMM GRANULOCYTES NFR BLD AUTO: 0.3 % — SIGNIFICANT CHANGE UP (ref 0–0.9)
INR BLD: 1.12 RATIO — SIGNIFICANT CHANGE UP (ref 0.88–1.16)
INR BLD: 1.16 RATIO — SIGNIFICANT CHANGE UP (ref 0.88–1.16)
IRON SATN MFR SERPL: 26 % — SIGNIFICANT CHANGE UP (ref 16–55)
IRON SATN MFR SERPL: 64 UG/DL — SIGNIFICANT CHANGE UP (ref 45–165)
LDH SERPL L TO P-CCNC: 231 U/L — SIGNIFICANT CHANGE UP (ref 50–242)
LIPID PNL WITH DIRECT LDL SERPL: 94 MG/DL — SIGNIFICANT CHANGE UP
LYMPHOCYTES # BLD AUTO: 1.42 K/UL — SIGNIFICANT CHANGE UP (ref 1–3.3)
LYMPHOCYTES # BLD AUTO: 22.8 % — SIGNIFICANT CHANGE UP (ref 13–44)
MAGNESIUM SERPL-MCNC: 1.9 MG/DL — SIGNIFICANT CHANGE UP (ref 1.6–2.6)
MCHC RBC-ENTMCNC: 29.4 PG — SIGNIFICANT CHANGE UP (ref 27–34)
MCHC RBC-ENTMCNC: 29.4 PG — SIGNIFICANT CHANGE UP (ref 27–34)
MCHC RBC-ENTMCNC: 34.5 GM/DL — SIGNIFICANT CHANGE UP (ref 32–36)
MCHC RBC-ENTMCNC: 34.7 GM/DL — SIGNIFICANT CHANGE UP (ref 32–36)
MCV RBC AUTO: 84.7 FL — SIGNIFICANT CHANGE UP (ref 80–100)
MCV RBC AUTO: 85.3 FL — SIGNIFICANT CHANGE UP (ref 80–100)
MONOCYTES # BLD AUTO: 0.58 K/UL — SIGNIFICANT CHANGE UP (ref 0–0.9)
MONOCYTES NFR BLD AUTO: 9.3 % — SIGNIFICANT CHANGE UP (ref 2–14)
NEUTROPHILS # BLD AUTO: 3.93 K/UL — SIGNIFICANT CHANGE UP (ref 1.8–7.4)
NEUTROPHILS NFR BLD AUTO: 63 % — SIGNIFICANT CHANGE UP (ref 43–77)
NON HDL CHOLESTEROL: 134 MG/DL — HIGH
NRBC # BLD: 0 /100 WBCS — SIGNIFICANT CHANGE UP (ref 0–0)
NRBC # BLD: 0 /100 WBCS — SIGNIFICANT CHANGE UP (ref 0–0)
NT-PROBNP SERPL-SCNC: <36 PG/ML — SIGNIFICANT CHANGE UP (ref 0–300)
PHOSPHATE SERPL-MCNC: 2.7 MG/DL — SIGNIFICANT CHANGE UP (ref 2.5–4.5)
PLATELET # BLD AUTO: 161 K/UL — SIGNIFICANT CHANGE UP (ref 150–400)
PLATELET # BLD AUTO: 169 K/UL — SIGNIFICANT CHANGE UP (ref 150–400)
POTASSIUM SERPL-MCNC: 4.1 MMOL/L — SIGNIFICANT CHANGE UP (ref 3.5–5.3)
POTASSIUM SERPL-MCNC: 4.1 MMOL/L — SIGNIFICANT CHANGE UP (ref 3.5–5.3)
POTASSIUM SERPL-SCNC: 4.1 MMOL/L — SIGNIFICANT CHANGE UP (ref 3.5–5.3)
POTASSIUM SERPL-SCNC: 4.1 MMOL/L — SIGNIFICANT CHANGE UP (ref 3.5–5.3)
PROT SERPL-MCNC: 7.3 G/DL — SIGNIFICANT CHANGE UP (ref 6–8.3)
PROTHROM AB SERPL-ACNC: 12.9 SEC — SIGNIFICANT CHANGE UP (ref 10.5–13.4)
PROTHROM AB SERPL-ACNC: 13.4 SEC — SIGNIFICANT CHANGE UP (ref 10.5–13.4)
RBC # BLD: 4.18 M/UL — LOW (ref 4.2–5.8)
RBC # BLD: 4.18 M/UL — LOW (ref 4.2–5.8)
RBC # BLD: 4.28 M/UL — SIGNIFICANT CHANGE UP (ref 4.2–5.8)
RBC # FLD: 12.9 % — SIGNIFICANT CHANGE UP (ref 10.3–14.5)
RBC # FLD: 12.9 % — SIGNIFICANT CHANGE UP (ref 10.3–14.5)
RETICS #: 63.1 K/UL — SIGNIFICANT CHANGE UP (ref 25–125)
RETICS/RBC NFR: 1.5 % — SIGNIFICANT CHANGE UP (ref 0.5–2.5)
RH IG SCN BLD-IMP: POSITIVE — SIGNIFICANT CHANGE UP
SODIUM SERPL-SCNC: 134 MMOL/L — LOW (ref 135–145)
SODIUM SERPL-SCNC: 135 MMOL/L — SIGNIFICANT CHANGE UP (ref 135–145)
TIBC SERPL-MCNC: 245 UG/DL — SIGNIFICANT CHANGE UP (ref 220–430)
TRANSFERRIN SERPL-MCNC: 208 MG/DL — SIGNIFICANT CHANGE UP (ref 200–360)
TRIGL SERPL-MCNC: 204 MG/DL — HIGH
TROPONIN T, HIGH SENSITIVITY RESULT: 43 NG/L — SIGNIFICANT CHANGE UP (ref 0–51)
TSH SERPL-MCNC: 2.46 UIU/ML — SIGNIFICANT CHANGE UP (ref 0.27–4.2)
UIBC SERPL-MCNC: 181 UG/DL — SIGNIFICANT CHANGE UP (ref 110–370)
VIT B12 SERPL-MCNC: 497 PG/ML — SIGNIFICANT CHANGE UP (ref 232–1245)
WBC # BLD: 6.16 K/UL — SIGNIFICANT CHANGE UP (ref 3.8–10.5)
WBC # BLD: 6.24 K/UL — SIGNIFICANT CHANGE UP (ref 3.8–10.5)
WBC # FLD AUTO: 6.16 K/UL — SIGNIFICANT CHANGE UP (ref 3.8–10.5)
WBC # FLD AUTO: 6.24 K/UL — SIGNIFICANT CHANGE UP (ref 3.8–10.5)

## 2023-06-13 PROCEDURE — 76377 3D RENDER W/INTRP POSTPROCES: CPT

## 2023-06-13 PROCEDURE — 80061 LIPID PANEL: CPT

## 2023-06-13 PROCEDURE — 84484 ASSAY OF TROPONIN QUANT: CPT

## 2023-06-13 PROCEDURE — 73590 X-RAY EXAM OF LOWER LEG: CPT

## 2023-06-13 PROCEDURE — 85025 COMPLETE CBC W/AUTO DIFF WBC: CPT

## 2023-06-13 PROCEDURE — 83615 LACTATE (LD) (LDH) ENZYME: CPT

## 2023-06-13 PROCEDURE — 82746 ASSAY OF FOLIC ACID SERUM: CPT

## 2023-06-13 PROCEDURE — 83735 ASSAY OF MAGNESIUM: CPT

## 2023-06-13 PROCEDURE — 86850 RBC ANTIBODY SCREEN: CPT

## 2023-06-13 PROCEDURE — 71045 X-RAY EXAM CHEST 1 VIEW: CPT | Mod: 26

## 2023-06-13 PROCEDURE — 83540 ASSAY OF IRON: CPT

## 2023-06-13 PROCEDURE — 84443 ASSAY THYROID STIM HORMONE: CPT

## 2023-06-13 PROCEDURE — 93306 TTE W/DOPPLER COMPLETE: CPT

## 2023-06-13 PROCEDURE — 36415 COLL VENOUS BLD VENIPUNCTURE: CPT

## 2023-06-13 PROCEDURE — 76376 3D RENDER W/INTRP POSTPROCES: CPT | Mod: 26

## 2023-06-13 PROCEDURE — 90471 IMMUNIZATION ADMIN: CPT

## 2023-06-13 PROCEDURE — 97161 PT EVAL LOW COMPLEX 20 MIN: CPT

## 2023-06-13 PROCEDURE — 99223 1ST HOSP IP/OBS HIGH 75: CPT

## 2023-06-13 PROCEDURE — 86900 BLOOD TYPING SEROLOGIC ABO: CPT

## 2023-06-13 PROCEDURE — 85379 FIBRIN DEGRADATION QUANT: CPT

## 2023-06-13 PROCEDURE — 73564 X-RAY EXAM KNEE 4 OR MORE: CPT

## 2023-06-13 PROCEDURE — 83036 HEMOGLOBIN GLYCOSYLATED A1C: CPT

## 2023-06-13 PROCEDURE — 83010 ASSAY OF HAPTOGLOBIN QUANT: CPT

## 2023-06-13 PROCEDURE — 82728 ASSAY OF FERRITIN: CPT

## 2023-06-13 PROCEDURE — 80053 COMPREHEN METABOLIC PANEL: CPT

## 2023-06-13 PROCEDURE — 99285 EMERGENCY DEPT VISIT HI MDM: CPT | Mod: 25

## 2023-06-13 PROCEDURE — 85610 PROTHROMBIN TIME: CPT

## 2023-06-13 PROCEDURE — 93005 ELECTROCARDIOGRAM TRACING: CPT

## 2023-06-13 PROCEDURE — 73110 X-RAY EXAM OF WRIST: CPT

## 2023-06-13 PROCEDURE — 73502 X-RAY EXAM HIP UNI 2-3 VIEWS: CPT

## 2023-06-13 PROCEDURE — 96374 THER/PROPH/DIAG INJ IV PUSH: CPT

## 2023-06-13 PROCEDURE — 93356 MYOCRD STRAIN IMG SPCKL TRCK: CPT

## 2023-06-13 PROCEDURE — 73700 CT LOWER EXTREMITY W/O DYE: CPT | Mod: 26,RT

## 2023-06-13 PROCEDURE — 71045 X-RAY EXAM CHEST 1 VIEW: CPT

## 2023-06-13 PROCEDURE — 73700 CT LOWER EXTREMITY W/O DYE: CPT | Mod: MA

## 2023-06-13 PROCEDURE — 82607 VITAMIN B-12: CPT

## 2023-06-13 PROCEDURE — 82962 GLUCOSE BLOOD TEST: CPT

## 2023-06-13 PROCEDURE — 85027 COMPLETE CBC AUTOMATED: CPT

## 2023-06-13 PROCEDURE — 86901 BLOOD TYPING SEROLOGIC RH(D): CPT

## 2023-06-13 PROCEDURE — 80048 BASIC METABOLIC PNL TOTAL CA: CPT

## 2023-06-13 PROCEDURE — 83880 ASSAY OF NATRIURETIC PEPTIDE: CPT

## 2023-06-13 PROCEDURE — 90715 TDAP VACCINE 7 YRS/> IM: CPT

## 2023-06-13 PROCEDURE — 83550 IRON BINDING TEST: CPT

## 2023-06-13 PROCEDURE — 76377 3D RENDER W/INTRP POSTPROCES: CPT | Mod: 26

## 2023-06-13 PROCEDURE — 85045 AUTOMATED RETICULOCYTE COUNT: CPT

## 2023-06-13 PROCEDURE — 84466 ASSAY OF TRANSFERRIN: CPT

## 2023-06-13 PROCEDURE — 85730 THROMBOPLASTIN TIME PARTIAL: CPT

## 2023-06-13 PROCEDURE — 84100 ASSAY OF PHOSPHORUS: CPT

## 2023-06-13 PROCEDURE — 76376 3D RENDER W/INTRP POSTPROCES: CPT

## 2023-06-13 PROCEDURE — 73130 X-RAY EXAM OF HAND: CPT

## 2023-06-13 PROCEDURE — 93306 TTE W/DOPPLER COMPLETE: CPT | Mod: 26

## 2023-06-13 PROCEDURE — 97165 OT EVAL LOW COMPLEX 30 MIN: CPT

## 2023-06-13 PROCEDURE — 73552 X-RAY EXAM OF FEMUR 2/>: CPT

## 2023-06-13 RX ORDER — LIDOCAINE 4 G/100G
1 CREAM TOPICAL
Qty: 30 | Refills: 0
Start: 2023-06-13 | End: 2023-07-12

## 2023-06-13 RX ORDER — DEXTROSE 50 % IN WATER 50 %
15 SYRINGE (ML) INTRAVENOUS ONCE
Refills: 0 | Status: DISCONTINUED | OUTPATIENT
Start: 2023-06-13 | End: 2023-06-13

## 2023-06-13 RX ORDER — SENNA PLUS 8.6 MG/1
2 TABLET ORAL AT BEDTIME
Refills: 0 | Status: DISCONTINUED | OUTPATIENT
Start: 2023-06-13 | End: 2023-06-13

## 2023-06-13 RX ORDER — INSULIN LISPRO 100/ML
VIAL (ML) SUBCUTANEOUS
Refills: 0 | Status: DISCONTINUED | OUTPATIENT
Start: 2023-06-13 | End: 2023-06-13

## 2023-06-13 RX ORDER — GLUCAGON INJECTION, SOLUTION 0.5 MG/.1ML
1 INJECTION, SOLUTION SUBCUTANEOUS ONCE
Refills: 0 | Status: DISCONTINUED | OUTPATIENT
Start: 2023-06-13 | End: 2023-06-13

## 2023-06-13 RX ORDER — SODIUM CHLORIDE 9 MG/ML
1000 INJECTION, SOLUTION INTRAVENOUS
Refills: 0 | Status: DISCONTINUED | OUTPATIENT
Start: 2023-06-13 | End: 2023-06-13

## 2023-06-13 RX ORDER — ONDANSETRON 8 MG/1
4 TABLET, FILM COATED ORAL EVERY 8 HOURS
Refills: 0 | Status: DISCONTINUED | OUTPATIENT
Start: 2023-06-13 | End: 2023-06-13

## 2023-06-13 RX ORDER — DEXTROSE 50 % IN WATER 50 %
25 SYRINGE (ML) INTRAVENOUS ONCE
Refills: 0 | Status: DISCONTINUED | OUTPATIENT
Start: 2023-06-13 | End: 2023-06-13

## 2023-06-13 RX ORDER — NALOXONE HYDROCHLORIDE 4 MG/.1ML
0.4 SPRAY NASAL ONCE
Refills: 0 | Status: DISCONTINUED | OUTPATIENT
Start: 2023-06-13 | End: 2023-06-13

## 2023-06-13 RX ORDER — ACETAMINOPHEN 500 MG
650 TABLET ORAL EVERY 6 HOURS
Refills: 0 | Status: DISCONTINUED | OUTPATIENT
Start: 2023-06-13 | End: 2023-06-13

## 2023-06-13 RX ORDER — INSULIN LISPRO 100/ML
VIAL (ML) SUBCUTANEOUS AT BEDTIME
Refills: 0 | Status: DISCONTINUED | OUTPATIENT
Start: 2023-06-13 | End: 2023-06-13

## 2023-06-13 RX ORDER — POLYETHYLENE GLYCOL 3350 17 G/17G
17 POWDER, FOR SOLUTION ORAL DAILY
Refills: 0 | Status: DISCONTINUED | OUTPATIENT
Start: 2023-06-13 | End: 2023-06-13

## 2023-06-13 RX ORDER — LIDOCAINE 4 G/100G
1 CREAM TOPICAL DAILY
Refills: 0 | Status: DISCONTINUED | OUTPATIENT
Start: 2023-06-13 | End: 2023-06-13

## 2023-06-13 RX ORDER — LIDOCAINE 4 G/100G
0 CREAM TOPICAL
Qty: 0 | Refills: 0 | DISCHARGE
Start: 2023-06-13

## 2023-06-13 RX ORDER — DEXTROSE 50 % IN WATER 50 %
12.5 SYRINGE (ML) INTRAVENOUS ONCE
Refills: 0 | Status: DISCONTINUED | OUTPATIENT
Start: 2023-06-13 | End: 2023-06-13

## 2023-06-13 RX ORDER — INSULIN LISPRO 100/ML
VIAL (ML) SUBCUTANEOUS EVERY 6 HOURS
Refills: 0 | Status: DISCONTINUED | OUTPATIENT
Start: 2023-06-13 | End: 2023-06-13

## 2023-06-13 RX ORDER — MORPHINE SULFATE 50 MG/1
2 CAPSULE, EXTENDED RELEASE ORAL EVERY 4 HOURS
Refills: 0 | Status: DISCONTINUED | OUTPATIENT
Start: 2023-06-13 | End: 2023-06-13

## 2023-06-13 RX ORDER — APIXABAN 2.5 MG/1
1 TABLET, FILM COATED ORAL
Qty: 60 | Refills: 0
Start: 2023-06-13 | End: 2023-07-12

## 2023-06-13 RX ORDER — LANOLIN ALCOHOL/MO/W.PET/CERES
3 CREAM (GRAM) TOPICAL AT BEDTIME
Refills: 0 | Status: DISCONTINUED | OUTPATIENT
Start: 2023-06-13 | End: 2023-06-13

## 2023-06-13 RX ORDER — MORPHINE SULFATE 50 MG/1
4 CAPSULE, EXTENDED RELEASE ORAL EVERY 4 HOURS
Refills: 0 | Status: DISCONTINUED | OUTPATIENT
Start: 2023-06-13 | End: 2023-06-13

## 2023-06-13 RX ORDER — ENOXAPARIN SODIUM 100 MG/ML
40 INJECTION SUBCUTANEOUS EVERY 24 HOURS
Refills: 0 | Status: DISCONTINUED | OUTPATIENT
Start: 2023-06-13 | End: 2023-06-13

## 2023-06-13 RX ADMIN — MORPHINE SULFATE 2 MILLIGRAM(S): 50 CAPSULE, EXTENDED RELEASE ORAL at 09:46

## 2023-06-13 RX ADMIN — Medication 650 MILLIGRAM(S): at 12:44

## 2023-06-13 RX ADMIN — Medication 3: at 05:29

## 2023-06-13 RX ADMIN — Medication 650 MILLIGRAM(S): at 13:30

## 2023-06-13 RX ADMIN — Medication 4: at 12:42

## 2023-06-13 RX ADMIN — MORPHINE SULFATE 2 MILLIGRAM(S): 50 CAPSULE, EXTENDED RELEASE ORAL at 10:15

## 2023-06-13 RX ADMIN — MORPHINE SULFATE 2 MILLIGRAM(S): 50 CAPSULE, EXTENDED RELEASE ORAL at 03:07

## 2023-06-13 NOTE — H&P ADULT - NSHPREVIEWOFSYSTEMS_GEN_ALL_CORE
CONSTITUTIONAL: No fever. no weakness  ENMT:  No sinus or throat pain  RESPIRATORY: No cough, wheezing, chills or hemoptysis; No shortness of breath  CARDIOVASCULAR: No chest pain, palpitations, dizziness, or leg swelling  GASTROINTESTINAL: No abdominal or epigastric pain. No nausea, vomiting, or hematemesis; No diarrhea or constipation. No melena or hematochezia.  GENITOURINARY: No dysuria or incontinence  NEUROLOGICAL: No headaches, memory loss, loss of strength, numbness, or tremors  SKIN: No rashes,  No hives or eczema  ENDOCRINE: No heat or cold intolerance; No hair loss  MUSCULOSKELETAL: left wrist pain and right knee pain  PSYCHIATRIC: No depression, anxiety, mood swings, or difficulty sleeping  HEME/LYMPH: No easy bruising, or bleeding gums; no enlarged LN

## 2023-06-13 NOTE — PHYSICAL THERAPY INITIAL EVALUATION ADULT - ADDITIONAL COMMENTS
Pt lives with wife in split level house, can stay in entry level room if needed. Is independent ambulator with no assistive device.

## 2023-06-13 NOTE — PROGRESS NOTE ADULT - REASON FOR ADMISSION
left wrist pain and right knee pain following fall
left wrist pain and right knee pain following fall

## 2023-06-13 NOTE — OCCUPATIONAL THERAPY INITIAL EVALUATION ADULT - PERTINENT HX OF CURRENT PROBLEM, REHAB EVAL
63 yo m w pmh obesity, macy, pre-dm, ed, p/w left wrist pain (9-10/10 in intensity, non radiating) and right knee pain (9-10/10 in intensity, non radiating), following mechanical fall. Patient states he was out for a walk and tripped on a curb/uneven sidewalk; subsequently fell directly onto his right knee and his left wrist.  Denies any head strike or loss of consciousness. not on blood thinners at home. Was unable to bear weight on the right lower extremity after the event, so presented to [facility] er for further evaluation.  CT RIGHT KNEE: Comminuted patellar fracture with mildly distracted fracture fragments.Large hemarthrosis.  XRAY HIP W/ PELVIS: 1.  Mildly displaced comminuted fracture involves the patella.2. Questionable osteochondral irregularity of the medial trochlea.XRAY WRIST : No acute fracture or dislocation.  XRAY LEFT HAND: No acute fracture or dislocation.  XRAY RIGHT KNEE:Mildly displaced comminuted fracture involves the patella. Questionable osteochondral irregularity of the medial trochlea.

## 2023-06-13 NOTE — DISCHARGE NOTE NURSING/CASE MANAGEMENT/SOCIAL WORK - NSDCVIVACCINE_GEN_ALL_CORE_FT
Tdap; 12-Jun-2023 17:13; Latanya Rocha); [employer]; [id number] (Exp. Date: 22-Feb-2025); IntraMuscular; Deltoid Right.; 0.5 milliLiter(s); VIS (VIS Published: 09-May-2013, VIS Presented: 12-Jun-2023);

## 2023-06-13 NOTE — PHYSICAL THERAPY INITIAL EVALUATION ADULT - RANGE OF MOTION EXAMINATION, REHAB EVAL
except R knee in alexi brace locked in extension/bilateral upper extremity ROM was WFL (within functional limits)/bilateral lower extremity ROM was WFL (within functional limits)

## 2023-06-13 NOTE — PATIENT PROFILE ADULT - FALL HARM RISK - ATTEMPT OOB
I have seen and evaluated patient at bedside with resident and agree with note and plan above   Pari Coffman   Pediatric Endocrinology   [phone] No

## 2023-06-13 NOTE — H&P ADULT - NSHPADDITIONALINFOADULT_GEN_ALL_CORE
full code  activity as tolerated   vte ppx w lovenox   carb consistent diet full code  activity as tolerated   vte ppx w scd  carb consistent diet

## 2023-06-13 NOTE — H&P ADULT - HISTORY OF PRESENT ILLNESS
63 yo m w pmh obesity, macy, pre-dm, ed, p/w left wrist pain (9-10/10 in intensity, non radiating) and right knee pain (9-10/10 in intensity, non radiating), following mechanical fall. Patient states he was out for a walk and tripped on a curb/uneven sidewalk; subsequently fell directly onto his right knee and his left wrist.  Denies any head strike or loss of consciousness. not on blood thinners at home. Was unable to bear weight on the right lower extremity after the event, so presented to [facility] er for further evaluation.    in er, found to have right patellar fracture, admit to medicine for further mgmt.

## 2023-06-13 NOTE — H&P ADULT - PROBLEM SELECTOR PLAN 3
mechanical in nature  ekg with no st seg - t wave changes suggestive of ischemia  no neuroimaging done in er  trauma work up as above  fall and fracture precautions  pt eval + sw/cm consult for disposition mechanical in nature  ekg with no st seg - t wave changes suggestive of ischemia; however, does show new RBBB (not seen on prior ekgs) and known 1st degree av block (seen on prior ekgs)  no neuroimaging done in er  trauma work up as above  follow up d-dimer, trop, bnp, tte, carotid duplex  monitor on telemetry  fall and fracture precautions  cards consult in am  pt eval + sw/cm consult for disposition mechanical in nature  ekg with no st seg - t wave changes suggestive of ischemia; however, does show new RBBB (not seen on prior ekgs) and known 1st degree av block (seen on prior ekgs)  prior nst 11/16/20: Defect in the inferior segment is reversible. EF 61%, hypokinesis in the inferior segments. Preserved lv systolic function.  prior card cath 12/2/20: There were no lv global or rmwa. EF estimated was 65 %. aortic and mitral valves wnl. coronary anatomy wnl with normal LM, LAD, CX, RCA.  no neuroimaging done in er  trauma work up as above  follow up d-dimer, trop, bnp, tte   monitor on telemetry  fall and fracture precautions  cards consult in am  pt eval + sw/cm consult for disposition

## 2023-06-13 NOTE — DISCHARGE NOTE PROVIDER - NSDCCPCAREPLAN_GEN_ALL_CORE_FT
PRINCIPAL DISCHARGE DIAGNOSIS  Diagnosis: Patellar fracture  Assessment and Plan of Treatment: No flexion at the Right knee joint.  - RLE Valencia knee brace locked at full extension.  - WBAT RLE.  - DVT prophylaxis with Xarelto or Eliquis.  - Orthopaedic office follow-up in 10-14 days. Call [phone] for appointment.  Alfonso Devries MD.     PRINCIPAL DISCHARGE DIAGNOSIS  Diagnosis: Patellar fracture  Assessment and Plan of Treatment: No flexion at the Right knee joint.  - RLE Gregory knee brace locked at full extension.  - WBAT RLE.  - DVT prophylaxis with Eliquis 5 mg twicw dailt  - Orthopaedic office follow-up in 10-14 days. Call [phone] for appointment.  Alfonso Devries MD.

## 2023-06-13 NOTE — DISCHARGE NOTE PROVIDER - HOSPITAL COURSE
· Pertinent History of Current Problem	61 yo m w pmh obesity, macy, pre-dm, ed, p/w left wrist pain (9-10/10 in intensity, non radiating) and right knee pain (9-10/10 in intensity, non radiating), following mechanical fall. Patient states he was out for a walk and tripped on a curb/uneven sidewalk; subsequently fell directly onto his right knee and his left wrist.  Denies any head strike or loss of consciousness. not on blood thinners at home. Was unable to bear weight on the right lower extremity after the event, so presented to [facility] er for further evaluation.  CT RIGHT KNEE: Comminuted patellar fracture with mildly distracted fracture fragments.Large hemarthrosis.  XRAY HIP W/ PELVIS: 1.  Mildly displaced comminuted fracture involves the patella.2. Questionable osteochondral irregularity of the medial trochlea.XRAY WRIST : No acute fracture or dislocation.  XRAY LEFT HAND: No acute fracture or dislocation.  XRAY RIGHT KNEE:Mildly displaced comminuted fracture involves the patella. Questionable osteochondral irregularity of the medial trochlea.   · Pertinent History of Current Problem	63 yo m w pmh obesity, macy, pre-dm, ed, p/w left wrist pain (9-10/10 in intensity, non radiating) and right knee pain (9-10/10 in intensity, non radiating), following mechanical fall. Patient states he was out for a walk and tripped on a curb/uneven sidewalk; subsequently fell directly onto his right knee and his left wrist.  Denies any head strike or loss of consciousness. not on blood thinners at home. Was unable to bear weight on the right lower extremity after the event, so presented to [facility] er for further evaluation.  CT RIGHT KNEE: Comminuted patellar fracture with mildly distracted fracture fragments.Large hemarthrosis.  XRAY HIP W/ PELVIS: 1.  Mildly displaced comminuted fracture involves the patella.2. Questionable osteochondral irregularity of the medial trochlea.XRAY WRIST : No acute fracture or dislocation.  XRAY LEFT HAND: No acute fracture or dislocation.  XRAY RIGHT KNEE:Mildly displaced comminuted fracture involves the patella. Questionable osteochondral irregularity of the medial trochlea. Discharge home

## 2023-06-13 NOTE — H&P ADULT - PROBLEM SELECTOR PLAN 2
Detail Level: Simple imaging reviewed; "No acute fracture or dislocation. 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

## 2023-06-13 NOTE — DISCHARGE NOTE NURSING/CASE MANAGEMENT/SOCIAL WORK - NSDCPEFALRISK_GEN_ALL_CORE
For information on Fall & Injury Prevention, visit: [email or web address] OR  [email or web address] OR  https://www.cdc.gov/steadi/patient.html

## 2023-06-13 NOTE — OCCUPATIONAL THERAPY INITIAL EVALUATION ADULT - DIAGNOSIS, OT EVAL
Pt presents with decreased strength, and ROM S/P patella fx  impacting functional mobility and ADLs.

## 2023-06-13 NOTE — OCCUPATIONAL THERAPY INITIAL EVALUATION ADULT - LIVES WITH, PROFILE
Pt reports living in a private  house, no steps to enter , with 1st floor step-up and walk-in shower with chair. Owns crutches. independent with all functional mobility  and ADLs prior./spouse

## 2023-06-13 NOTE — OCCUPATIONAL THERAPY INITIAL EVALUATION ADULT - PHYSICAL ASSIST/NONPHYSICAL ASSIST: SIT/SUPINE, REHAB EVAL
verbal cues/1 person assist Siliq Counseling:  I discussed with the patient the risks of Siliq including but not limited to new or worsening depression, suicidal thoughts and behavior, immunosuppression, malignancy, posterior leukoencephalopathy syndrome, and serious infections.  The patient understands that monitoring is required including a PPD at baseline and must alert us or the primary physician if symptoms of infection or other concerning signs are noted. There is also a special program designed to monitor depression which is required with Siliq.

## 2023-06-13 NOTE — DISCHARGE NOTE NURSING/CASE MANAGEMENT/SOCIAL WORK - PATIENT PORTAL LINK FT
You can access the FollowMyHealth Patient Portal offered by [facility] by registering at the following website: [email or web address]. By joining [employer]’s FollowMyHealth portal, you will also be able to view your health information using other applications (apps) compatible with our system.

## 2023-06-13 NOTE — PROGRESS NOTE ADULT - ASSESSMENT
{\rtf1\aqaowa10590\ansi\slgjlvl9456\ftnbj\uc1\deff0  {\fonttbl{\f0 \fnil Segoe UI;}{\f1 \fnil \fcharset0 Segoe UI;}{\f2 \fnil Times New Jeremy;}}  {\colortbl ;\mef210\cpdmd378\qmtl285 ;\red0\green0\blue0 ;\red0\green0\dqxe748 ;\red0\green0\blue0 ;}  {\stylesheet{\f0\fs20 Normal;}{\cs1 Default Paragraph Font;}{\cs2\f0\fs16 Line Number;}{\cs3\f2\fs24\ul\cf3 Hyperlink;}}  {\*\revtbl{Unknown;}}  \zygcbz70426\vzxzkg61634\wwlmv8333\ysbtl3196\coycn7737\mxeig7855\slresyk847\jmmgdbg036\nogrowautofit\dilzzc728\formshade\nofeaturethrottle1\dntblnsbdb\fet4\aendnotes\aftnnrlc\pgbrdrhead\pgbrdrfoot  \sectd\qtagqa82078\tqeuhp71066\guttersxn0\qkttzyzo6744\xsejhbjh3561\nnzwuxly5716\nnpminjp6230\macowwr570\ebqvvge801\sbkpage\pgncont\pgndec  \plain\plain\f0\fs24\ql\plain\f0\fs24\plain\f0\fs20\pzjy9192\hich\f0\dbch\f0\loch\f0\fs20 61 yo m w pmh obesity, macy, pre-dm, ed, p/w left wrist pain and right knee pain, following mechanical fall, found to have right patellar fracture, admit to medicine   for further mgmt.\par  \par  {\*\bkmkstart [id number]}{\*\bkmkend uo96663547330}{\*\bkmkstart kt41409607689}{\*\bkmkend kt27390553002}Patellar fracture. \par  - {\*\bkmkstart rf80175261344}{\*\bkmkend dr90673483298}{\*\bkmkstart ob50907791141}{\*\bkmkend ez43980241194}imaging reviewed; "\u8220 ?Mildly displaced comminuted fracture involves the patella. Questionable osteochondral irregularity of the medial trochlea.\u8221   ?\par  - ortho consult noted. No intervention\par  - pain control w bowel regimen as needed\par  \par  {\*\bkmkstart md94548039353}{\*\bkmkend ty76140518327}{\*\bkmkstart ik37306478605}{\*\bkmkend dt38065776759}Left wrist pain. \par  - {\*\bkmkstart ez48947023594}{\*\bkmkend uz24603126917} {\*\bkmkstart ly22503572879}{\*\bkmkend ot46642329417}imaging reviewed; "No acute fracture or dislocation. 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 \par  - {\*\bkmkstart op14485818014}{\*\bkmkend yc09634495444}{\*\bkmkstart np99917761635}{\*\bkmkend oc92065608569}mechanical in nature\par  ekg with no st seg - t wave changes suggestive of ischemia; however, does show new RBBB (not seen on prior ekgs) and known 1st degree av block (seen on prior ekgs)\par  prior nst 11/16/20: Defect in the inferior segment is reversible. EF 61%, hypokinesis in the inferior segments. Preserved lv systolic function.\par  prior card cath 12/2/20: There were no lv global or rmwa. EF estimated was 65 %. aortic and mitral valves wnl. coronary anatomy wnl with normal LM, LAD, CX, RCA.\par  no neuroimaging done in er\par  trauma work up as above\par  follow up d-dimer, trop, bnp, tte \par  monitor on telemetry\par  fall and fracture precautions\par  pt eval + sw/cm consult for disposition\plain\f1\fs20\xmkk5331\hich\f1\dbch\f1\loch\f1\cf2\fs20\strike\plain\f0\fs20\oglf1420\hich\f0\dbch\f0\loch\f0\fs20 patient refuse rehab\par  \par  {\*\bkmkstart ef25801493748}{\*\bkmkend zq83226433961}{\*\bkmkstart sw39661881222}{\*\bkmkend oo63066818123}MACY (obstructive sleep apnea). \par  - {\*\bkmkstart ru05971787658}{\*\bkmkend it47185019048}{\*\bkmkstart ta51142683451}{\*\bkmkend qc18027281791}on home cpap nocturnal.\par  \par  {\*\bkmkstart ld38854516020}{\*\bkmkend na57741559270}{\*\bkmkstart tx04631441464}{\*\bkmkend ip03788214200}Prediabetes. \par  - {\*\bkmkstart je82589589161}{\*\bkmkend di86996943532}{\*\bkmkstart am74575309455}{\*\bkmkend fd60644159753}currently managing with lifestyle modifications at home\par  follow up a1c\par  monitor fingerstick glu tidac + hs\par  carb consistent diet\par  low dose correctional scale lispro tidac + hs while inhouse\par  adjust regimen to maintain goal bg 100-180.\par  \par  {\*\bkmkstart hd16840262807}{\*\bkmkend dk93945309097}{\*\bkmkstart jy57810511266}{\*\bkmkend dg95581658714}HTN (hypertension). \par  -{\*\bkmkstart vf26992938326}{\*\bkmkend la46004067299}{\*\bkmkstart pd20358983256}{\*\bkmkend gk02486458348}on no antihtn at home\par  - goal bp <150/90 mmhg.\par  \par  {\*\bkmkstart dk79301377001}{\*\bkmkend ab43520578010}{\*\bkmkstart yw45524178611}{\*\bkmkend ps73114983219}HLD (hyperlipidemia). \par  - {\*\bkmkstart qi58114627723}{\*\bkmkend gk86983803627}{\*\bkmkstart jh82820117703}{\*\bkmkend yl00875569651}currently managing with lifestyle modifications at home\par  - follow up lipid panel.\par  \par  {\*\bkmkstart hc60632108614}{\*\bkmkend lz87992223312}{\*\bkmkstart tg60859925804}{\*\bkmkend ej02405412708}ED (erectile dysfunction). \par  - {\*\bkmkstart jr61446564905}{\*\bkmkend kv16102892242}{\*\bkmkstart bx74995189161}{\*\bkmkend fp88291257168}on tadalafil prn at home.\par  \par  DC home. Follow with PMD/ Ortho in 3-4 days DVT prophylaxis with Eliquis. \par  \par  d/w patient and wife\par  \par  Sharif Frances MD phone [phone] \par  }

## 2023-06-13 NOTE — PROGRESS NOTE ADULT - SUBJECTIVE AND OBJECTIVE BOX
Patient 62 Y M with post R patella fracture from fall accident  patient evaluated measured and fitted for RLE post op   [place] knee orthosis locked in full extension as ordered by  Orthopedics to treat injury aid in healing and recovery  allow weight bearing delivered by [facility]   [phone]
Patient is a 62y old  Male who presents with a chief complaint of left wrist pain and right knee pain following fall (13 Jun 2023 15:49)      SUBJECTIVE / OVERNIGHT EVENTS: Comfortable without new complaints.   Review of Systems  chest pain no  palpitations no  sob no  nausea no  headache no    MEDICATIONS  (STANDING):  dextrose 5%. 1000 milliLiter(s) (50 mL/Hr) IV Continuous <Continuous>  dextrose 5%. 1000 milliLiter(s) (100 mL/Hr) IV Continuous <Continuous>  dextrose 50% Injectable 12.5 Gram(s) IV Push once  dextrose 50% Injectable 25 Gram(s) IV Push once  dextrose 50% Injectable 25 Gram(s) IV Push once  glucagon  Injectable 1 milliGRAM(s) IntraMuscular once  insulin lispro (ADMELOG) corrective regimen sliding scale   SubCutaneous every 6 hours  lidocaine   4% Patch 1 Patch Transdermal daily  naloxone Injectable 0.4 milliGRAM(s) IV Push once  polyethylene glycol 3350 17 Gram(s) Oral daily  senna 2 Tablet(s) Oral at bedtime    MEDICATIONS  (PRN):  acetaminophen     Tablet .. 650 milliGRAM(s) Oral every 6 hours PRN Temp greater or equal to 38C (100.4F), Mild Pain (1 - 3)  aluminum hydroxide/magnesium hydroxide/simethicone Suspension 30 milliLiter(s) Oral every 4 hours PRN Dyspepsia  bisacodyl 5 milliGRAM(s) Oral daily PRN Constipation  dextrose Oral Gel 15 Gram(s) Oral once PRN Blood Glucose LESS THAN 70 milliGRAM(s)/deciliter  melatonin 3 milliGRAM(s) Oral at bedtime PRN Insomnia  morphine  - Injectable 4 milliGRAM(s) IV Push every 4 hours PRN Severe Pain (7 - 10)  morphine  - Injectable 2 milliGRAM(s) IV Push every 4 hours PRN Moderate Pain (4 - 6)  ondansetron Injectable 4 milliGRAM(s) IV Push every 8 hours PRN Nausea and/or Vomiting      Vital Signs Last 24 Hrs  T(C): 36.8 (13 Jun 2023 15:48), Max: 37 (13 Jun 2023 00:26)  T(F): 98.2 (13 Jun 2023 15:48), Max: 98.6 (13 Jun 2023 00:26)  HR: 64 (13 Jun 2023 15:48) (64 - 90)  BP: 126/67 (13 Jun 2023 15:48) (126/67 - 143/79)  BP(mean): --  RR: 18 (13 Jun 2023 15:48) (17 - 18)  SpO2: 94% (13 Jun 2023 15:48) (93% - 97%)    Parameters below as of 13 Jun 2023 15:48  Patient On (Oxygen Delivery Method): room air        PHYSICAL EXAM:  GENERAL: NAD, well-developed  HEAD:  Atraumatic, Normocephalic  EYES: EOMI, PERRLA, conjunctiva and sclera clear  NECK: Supple, No JVD  CHEST/LUNG: Clear to auscultation bilaterally; No wheeze  HEART: Regular rate and rhythm; No murmurs, rubs, or gallops  ABDOMEN: Soft, Nontender, Nondistended; Bowel sounds present  EXTREMITIES: R leg in immobilizer  PSYCH: AAOx3  NEUROLOGY: non-focal  SKIN: No rashes or lesions    LABS:                        12.3   6.24  )-----------( 169      ( 13 Jun 2023 07:10 )             35.4     06-13    134<L>  |  98  |  15  ----------------------------<  296<H>  4.1   |  26  |  0.77    Ca    9.2      13 Jun 2023 07:07  Phos  2.7     06-13  Mg     1.9     06-13    TPro  7.3  /  Alb  4.5  /  TBili  0.6  /  DBili  x   /  AST  23  /  ALT  40  /  AlkPhos  87  06-13    PT/INR - ( 13 Jun 2023 07:15 )   PT: 12.9 sec;   INR: 1.12 ratio         PTT - ( 13 Jun 2023 07:15 )  PTT:28.5 sec            RADIOLOGY & ADDITIONAL TESTS:    Imaging Personally Reviewed:    Consultant(s) Notes Reviewed:      Care Discussed with Consultants/Other Providers:

## 2023-06-13 NOTE — H&P ADULT - NSICDXPASTSURGICALHX_GEN_ALL_CORE_FT
PAST SURGICAL HISTORY:  H/O discectomy     H/O spinal fusion lumbar spinal fusion  spinal fixation device  discectomy

## 2023-06-13 NOTE — H&P ADULT - PROBLEM SELECTOR PLAN 5
currently managing with lifestyle modifications at home  follow up a1c  monitor fingerstick glu tidac + hs  carb consistent diet  low dose correctional scale lispro tidac + hs while inhouse  adjust regimen to maintain goal bg 100-180

## 2023-06-13 NOTE — DISCHARGE NOTE PROVIDER - CARE PROVIDER_API CALL
Alfonso Devries  Orthopaedic Surgery  [address] [place] [address], [address]  [place] [address]  Phone: [phone]  Fax: [phone]  Follow Up Time: 2 weeks

## 2023-06-13 NOTE — H&P ADULT - NSHPPHYSICALEXAM_GEN_ALL_CORE
T(C): 37 (06-13-23 @ 00:26), Max: 37 (06-13-23 @ 00:26)  HR: 81 (06-13-23 @ 00:26) (79 - 90)  BP: 142/78 (06-13-23 @ 00:26) (122/76 - 142/78)  RR: 17 (06-13-23 @ 00:26) (17 - 18)  SpO2: 95% (06-13-23 @ 00:26) (95% - 97%)  GENERAL: NAD, lying in bed, obese  EYES: EOMI, PERRLA; conjunctiva and sclera clear  ENMT: Moist oral mucosa, no pharyngeal injection or exudates  NECK: Supple, no palpable masses; no JVD  RESPIRATORY: Normal respiratory effort; lungs are clear to auscultation bilaterally  CARDIOVASCULAR: Regular rate and rhythm, normal S1 and S2, no murmur/rub/gallop; No lower extremity edema; Peripheral pulses are 2+ bilaterally  ABDOMEN: Nontender to palpation, normoactive bowel sounds, no rebound/guarding   MUSCULOSKELETAL: ttp over l wrist and r kknee; decreased rom in both joints  PSYCH: A+O to person, place, and time; affect appropriate  NEUROLOGY: CN 2-12 are intact and symmetric; no gross motor or sensory deficits   SKIN: No rashes; no palpable lesions

## 2023-06-13 NOTE — H&P ADULT - ASSESSMENT
61 yo m w pmh obesity, macy, pre-dm, ed, p/w left wrist pain and right knee pain, following mechanical fall, found to have right patellar fracture, admit to medicine for further mgmt.    right knee    left wrist    fall    macy    predm    htn    hld    ed   61 yo m w pmh obesity, macy, pre-dm, ed, p/w left wrist pain and right knee pain, following mechanical fall, found to have right patellar fracture, admit to medicine for further mgmt.    right knee  imaging reviewed; "nondisplaced right fibular fracture...severe osteoarthrosis of right knee w large knee joint effusion"  ortho consulted by er; pending formal eval and final recs  elevate and ice affected area  pain control w bowel regimen as needed   vte ppx with subq lovenox    left wrist  imaging reviewed; "nondisplaced right fibular fracture...severe osteoarthrosis of right knee w large knee joint effusion"  ortho consulted by er; pending formal eval and final recs  elevate and ice affected area  pain control + bowel regimen as needed   63 yo m w pmh obesity, macy, pre-dm, ed, p/w left wrist pain and right knee pain, following mechanical fall, found to have right patellar fracture, admit to medicine for further mgmt. 63 yo m w pmh obesity, macy, pre-dm, ed, p/w left wrist pain and right knee pain, following mechanical fall, found to have right patellar fracture, admit to medicine for further mgmt.

## 2023-06-13 NOTE — PHYSICAL THERAPY INITIAL EVALUATION ADULT - PERTINENT HX OF CURRENT PROBLEM, REHAB EVAL
62 y/oM p/w R knee pain following mechanical fall. Tripped, fell directly onto R flexed knee. As per orthopedic consult, xray with comminuted fx of R patella. Is NPO for possible procedure, needs medical optimization for OR. NWB in bulky [place] knee immobilizer 62 y/oM p/w R knee pain following mechanical fall. Tripped, fell directly onto R flexed knee. As per orthopedic consult, xray with comminuted fx of R patella. Is NPO for possible procedure, needs medical optimization for OR. NWB in bulky [place] knee immobilizer.  15:40- Update to Ortho consult- pt is non-op, RLE [place] knee brace locked in full extension, WBAT RLE

## 2023-06-13 NOTE — PATIENT PROFILE ADULT - FUNCTIONAL ASSESSMENT - BASIC MOBILITY ASSESSMENT TYPE
Progress Notes by Manjit Bob DO at 02/06/18 08:25 AM     Author:  Manjit Bob DO Service:  (none) Author Type:  Physician     Filed:  02/06/18 08:59 AM Encounter Date:  2/6/2018 Status:  Signed     :  Manjit Bob DO (Physician)            Last visit with MANJIT BOB was on 12/25/2017 at  1:15 PM in IMMEDIATE CARE SEQ  Last visit with [facility] was on 12/25/2017 at  1:15 PM in IMMEDIATE CARE SEQ  Match done based on reference date of today 2/6/18    SUBJECTIVE  HPI Jt is 5 year old male who presents w/ c/o[MB1.1T]  body aches, fever and headache (moderate)[MB1.1M] this has been present for[MB1.1T] 2 days[MB1.1M]. Other symptoms include[MB1.1T] nasal congestion[MB1.1M]. No nausea, vomiting, diarrhea, constipation, urinary difficulties. No SOB or wheezing. ROS negative otherwise    OTC meds tried:[MB1.1T] [+][MB1.1M]  No past medical history on file.   No past surgical history on file.[MB1.2T]     OBJECTIVE  Filed Vitals:     02/06/18 0816   Pulse: 98   Temp: 99.9 °F (37.7 °C)   TempSrc: Tympanic   SpO2: 98%   Weight: 54 lb (24.5 kg)     No acute distress, voice[MB1.1T] nasal[MB1.1M]  Ears:[MB1.1T] chronic dull changes[MB1.1M]  Eyes:[MB1.1T] conjunctivae and sclerae normal, pupils equal, round, reactive to light and accomodation[MB1.1M]  Nares:[MB1.1T] mucosa erythematous and swollen[MB1.1M]  Throat:  Moist,   Neck:[MB1.1T] no lymphadenopathy or thyromegaly[MB1.1M]  Lungs:[MB1.1T] clear to auscultation and mild raspy cough[MB1.1M]  Heart:[MB1.1T] regular rate and rhythm[MB1.1M]  Abdomen:[MB1.1T] Soft, non-tender, normal BS, no masses, organomegaly, rebound or guarding.[MB1.1M]  Peripheral pulse was intact, capillary refill was normal, sensory function was intact and no edema  Skin:[MB1.1T] Skin color, texture, turgor normal. No rashes or lesions.    Flu:[MB1.1M] [+] A[MB1.3M]    ASSESSMENT[MB1.1T]  Influenza A  URI Fever[MB1.3M]    PLAN  Underwent discussion with[MB1.1T] father[MB1.3M] regarding  this clinical situation. symptomatic measures were given and[MB1.1T] meds discussed[MB1.3M]    F/U: Jt will follow up with his primary care physician as needed or as directed but may return to the [facility] if needed. If symptoms become severe may go to the ER.    Electronically Signed by:    Ashutosh Guzman DO , 2/6/2018[MB1.1T]           Revision History        User Key Date/Time User Provider Type Action    > MB1.3 02/06/18 08:59 AM Ashutosh Guzman DO Physician Sign     MB1.2 02/06/18 08:26 AM Ashutosh Guzman DO Physician      MB1.1 02/06/18 08:25 AM Ashutosh Guzman DO Physician     M - Manual, T - Template             Admission

## 2023-06-13 NOTE — H&P ADULT - PROBLEM SELECTOR PLAN 1
imaging reviewed; "“Mildly displaced comminuted fracture involves the patella. Questionable osteochondral irregularity of the medial trochlea.”  ortho consulted by er; pending formal eval and final recs  follow up ct rle, ordered by er  elevate and ice affected area  pain control w bowel regimen as needed   vte ppx with subq lovenox imaging reviewed; "“Mildly displaced comminuted fracture involves the patella. Questionable osteochondral irregularity of the medial trochlea.”  ortho consulted by er   follow up ct rle, ordered by er  elevate and ice affected area  pain control w bowel regimen as needed    61 yo m w asa iii, mets >4, is at at least intermediate risk of perioperative complication from a low to intermediate risk procedure in L patella ORIF; as benefit of procedure deemed to outweigh risk, perioperative optimization/risk management as described below in preparation for said procedure. imaging reviewed; "“Mildly displaced comminuted fracture involves the patella. Questionable osteochondral irregularity of the medial trochlea.”  ortho consulted by er   follow up ct rle, ordered by er  elevate and ice affected area  pain control w bowel regimen as needed    61 yo m w asa iii, mets >4, is at at least intermediate risk of perioperative complication from a low to intermediate risk procedure in R patella ORIF; as benefit of procedure deemed to outweigh risk, perioperative optimization/risk management as described below in preparation for said procedure.

## 2023-06-13 NOTE — PATIENT PROFILE ADULT - FALL HARM RISK - HARM RISK INTERVENTIONS
Assistance with ambulation/Assistance OOB with selected safe patient handling equipment/Communicate Risk of Fall with Harm to all staff/Discuss with provider need for PT consult/Monitor gait and stability/Reinforce activity limits and safety measures with patient and family/Tailored Fall Risk Interventions/Visual Cue: Yellow wristband and red socks/Bed in lowest position, wheels locked, appropriate side rails in place/Call bell, personal items and telephone in reach/Instruct patient to call for assistance before getting out of bed or chair/Non-slip footwear when patient is out of bed/[place] to call system/Physically safe environment - no spills, clutter or unnecessary equipment/Purposeful Proactive Rounding/Room/bathroom lighting operational, light cord in reach

## 2024-01-23 ENCOUNTER — APPOINTMENT (OUTPATIENT)
Dept: PULMONOLOGY | Facility: CLINIC | Age: 64
End: 2024-01-23
Payer: MEDICARE

## 2024-01-23 VITALS — OXYGEN SATURATION: 97 % | HEART RATE: 96 BPM | SYSTOLIC BLOOD PRESSURE: 133 MMHG | DIASTOLIC BLOOD PRESSURE: 72 MMHG

## 2024-01-23 PROCEDURE — 99213 OFFICE O/P EST LOW 20 MIN: CPT

## 2024-01-23 PROCEDURE — ZZZZZ: CPT

## 2024-01-23 NOTE — ASSESSMENT
[FreeTextEntry1] : Patient is currently on treatment with PAP. Data download confirms excellent compliance. Patient continues to use treatment and is benefiting from it.  Device is quite old and should be replaced will arrange new home sleep study and if LUZMA confirmed we will order replacement equipment

## 2024-01-23 NOTE — HISTORY OF PRESENT ILLNESS
[TextBox_4] : Follow-up for sleep apnea history of LUZMA currently using ResMed S9 auto CPAP current setting 5-15.   Currently using P10 nasal pillow.  Now on Medicare

## 2024-01-24 PROCEDURE — 95800 SLP STDY UNATTENDED: CPT

## 2024-01-26 PROCEDURE — 95800 SLP STDY UNATTENDED: CPT | Mod: 52

## 2024-02-20 ENCOUNTER — APPOINTMENT (OUTPATIENT)
Dept: PULMONOLOGY | Facility: CLINIC | Age: 64
End: 2024-02-20
Payer: MEDICARE

## 2024-02-20 VITALS — DIASTOLIC BLOOD PRESSURE: 77 MMHG | SYSTOLIC BLOOD PRESSURE: 164 MMHG | OXYGEN SATURATION: 97 % | HEART RATE: 106 BPM

## 2024-02-20 PROCEDURE — 99213 OFFICE O/P EST LOW 20 MIN: CPT

## 2024-02-20 NOTE — HISTORY OF PRESENT ILLNESS
[TextBox_4] : F/u   Overall feeling well; no respiratory complaints reported at this time  Currently using CPAP - Resmed S9, nasal pillow mask (possibly the AirFit P10, size medium); pressure settings of 5-15  Here to review HSS

## 2024-02-20 NOTE — ASSESSMENT
[FreeTextEntry1] : The pathophysiology as well as medical implications of untreated obstructive sleep apnea syndrome were discussed with this patient. Patient will continue to use CPAP; new CPAP will be ordered with the same pressures of current machine   Follow-up for compliance assessment

## 2024-04-09 ENCOUNTER — APPOINTMENT (OUTPATIENT)
Dept: PULMONOLOGY | Facility: CLINIC | Age: 64
End: 2024-04-09
Payer: MEDICARE

## 2024-04-09 VITALS
WEIGHT: 242 LBS | OXYGEN SATURATION: 98 % | RESPIRATION RATE: 16 BRPM | DIASTOLIC BLOOD PRESSURE: 77 MMHG | BODY MASS INDEX: 34.72 KG/M2 | SYSTOLIC BLOOD PRESSURE: 128 MMHG | HEART RATE: 97 BPM

## 2024-04-09 DIAGNOSIS — G47.33 OBSTRUCTIVE SLEEP APNEA (ADULT) (PEDIATRIC): ICD-10-CM

## 2024-04-09 PROCEDURE — 99213 OFFICE O/P EST LOW 20 MIN: CPT

## 2024-04-10 PROBLEM — G47.33 OSA (OBSTRUCTIVE SLEEP APNEA): Status: ACTIVE | Noted: 2020-04-27

## 2024-04-10 NOTE — HISTORY OF PRESENT ILLNESS
[TextBox_4] : Here for LUZMA followup. Reports no current respiratory symptoms or sleep symptoms. Using PAP on a nightly basis without difficulty. Reports no symptoms of daytime sleepiness. Getting supplies regularly.   Device: ResMed S11  Vendor: [employer]  Settin-15  Mask: p10  Issues: none, He likes the new machine

## 2024-04-10 NOTE — ASSESSMENT
[FreeTextEntry1] :  Patient is currently on treatment with PAP. Data download confirms excellent compliance. Patient continues to use treatment and is benefiting from it.  Follow-up in 1 year

## 2024-12-23 ENCOUNTER — APPOINTMENT (OUTPATIENT)
Dept: ORTHOPEDIC SURGERY | Facility: CLINIC | Age: 64
End: 2024-12-23
Payer: MEDICARE

## 2024-12-23 VITALS
HEART RATE: 103 BPM | DIASTOLIC BLOOD PRESSURE: 77 MMHG | SYSTOLIC BLOOD PRESSURE: 125 MMHG | BODY MASS INDEX: 34.65 KG/M2 | WEIGHT: 242 LBS | HEIGHT: 70 IN

## 2024-12-23 DIAGNOSIS — M25.561 PAIN IN RIGHT KNEE: ICD-10-CM

## 2024-12-23 PROCEDURE — 20610 DRAIN/INJ JOINT/BURSA W/O US: CPT | Mod: RT

## 2024-12-23 PROCEDURE — 99203 OFFICE O/P NEW LOW 30 MIN: CPT | Mod: 25

## 2024-12-23 PROCEDURE — 73562 X-RAY EXAM OF KNEE 3: CPT | Mod: RT

## 2025-01-09 ENCOUNTER — APPOINTMENT (OUTPATIENT)
Dept: ORTHOPEDIC SURGERY | Facility: CLINIC | Age: 65
End: 2025-01-09
Payer: MEDICARE

## 2025-01-09 VITALS — WEIGHT: 244 LBS | HEIGHT: 71 IN | BODY MASS INDEX: 34.16 KG/M2

## 2025-01-09 DIAGNOSIS — M17.11 UNILATERAL PRIMARY OSTEOARTHRITIS, RIGHT KNEE: ICD-10-CM

## 2025-01-09 DIAGNOSIS — M25.561 PAIN IN RIGHT KNEE: ICD-10-CM

## 2025-01-09 PROCEDURE — 99215 OFFICE O/P EST HI 40 MIN: CPT

## 2025-01-10 PROBLEM — M17.11 PRIMARY LOCALIZED OSTEOARTHRITIS OF RIGHT KNEE: Status: ACTIVE | Noted: 2025-01-10

## 2025-02-05 ENCOUNTER — NON-APPOINTMENT (OUTPATIENT)
Age: 65
End: 2025-02-05

## 2025-02-05 ASSESSMENT — KOOS JR
TWISING OR PIVOTING ON KNEE: SEVERE
HOW SEVERE IS YOUR KNEE STIFFNESS AFTER FIRST WAKING IN MORNING: MODERATE
KOOS JR RAW SCORE: 22
STRAIGHTENING KNEE FULLY: MILD
BENDING TO THE FLOOR TO PICK UP OBJECT: EXTREME
GOING UP OR DOWN STAIRS: EXTREME
RISING FROM SITTING: EXTREME
STANDING UPRIGHT: EXTREME

## 2025-02-26 ENCOUNTER — OUTPATIENT (OUTPATIENT)
Dept: OUTPATIENT SERVICES | Facility: HOSPITAL | Age: 65
LOS: 1 days | End: 2025-02-26
Payer: MEDICARE

## 2025-02-26 VITALS
HEIGHT: 71 IN | HEART RATE: 86 BPM | DIASTOLIC BLOOD PRESSURE: 78 MMHG | WEIGHT: 240.97 LBS | RESPIRATION RATE: 16 BRPM | SYSTOLIC BLOOD PRESSURE: 140 MMHG | OXYGEN SATURATION: 99 % | TEMPERATURE: 99 F

## 2025-02-26 DIAGNOSIS — Z29.9 ENCOUNTER FOR PROPHYLACTIC MEASURES, UNSPECIFIED: ICD-10-CM

## 2025-02-26 DIAGNOSIS — M19.90 UNSPECIFIED OSTEOARTHRITIS, UNSPECIFIED SITE: ICD-10-CM

## 2025-02-26 DIAGNOSIS — Z98.890 OTHER SPECIFIED POSTPROCEDURAL STATES: Chronic | ICD-10-CM

## 2025-02-26 DIAGNOSIS — Z98.1 ARTHRODESIS STATUS: Chronic | ICD-10-CM

## 2025-02-26 DIAGNOSIS — M17.11 UNILATERAL PRIMARY OSTEOARTHRITIS, RIGHT KNEE: ICD-10-CM

## 2025-02-26 DIAGNOSIS — Z01.818 ENCOUNTER FOR OTHER PREPROCEDURAL EXAMINATION: ICD-10-CM

## 2025-02-26 DIAGNOSIS — E11.9 TYPE 2 DIABETES MELLITUS WITHOUT COMPLICATIONS: ICD-10-CM

## 2025-02-26 DIAGNOSIS — G47.33 OBSTRUCTIVE SLEEP APNEA (ADULT) (PEDIATRIC): ICD-10-CM

## 2025-02-26 LAB
ANION GAP SERPL CALC-SCNC: 13 MMOL/L — SIGNIFICANT CHANGE UP (ref 5–17)
BUN SERPL-MCNC: 28 MG/DL — HIGH (ref 7–23)
CALCIUM SERPL-MCNC: 10.4 MG/DL — SIGNIFICANT CHANGE UP (ref 8.4–10.5)
CHLORIDE SERPL-SCNC: 101 MMOL/L — SIGNIFICANT CHANGE UP (ref 96–108)
CO2 SERPL-SCNC: 23 MMOL/L — SIGNIFICANT CHANGE UP (ref 22–31)
CREAT SERPL-MCNC: 1.08 MG/DL — SIGNIFICANT CHANGE UP (ref 0.5–1.3)
EGFR: 76 ML/MIN/1.73M2 — SIGNIFICANT CHANGE UP
GLUCOSE SERPL-MCNC: 181 MG/DL — HIGH (ref 70–99)
HCT VFR BLD CALC: 35.8 % — LOW (ref 39–50)
HGB BLD-MCNC: 12.2 G/DL — LOW (ref 13–17)
MCHC RBC-ENTMCNC: 29.5 PG — SIGNIFICANT CHANGE UP (ref 27–34)
MCHC RBC-ENTMCNC: 34.1 G/DL — SIGNIFICANT CHANGE UP (ref 32–36)
MCV RBC AUTO: 86.7 FL — SIGNIFICANT CHANGE UP (ref 80–100)
NRBC BLD AUTO-RTO: 0 /100 WBCS — SIGNIFICANT CHANGE UP (ref 0–0)
PLATELET # BLD AUTO: 202 K/UL — SIGNIFICANT CHANGE UP (ref 150–400)
POTASSIUM SERPL-MCNC: 4.3 MMOL/L — SIGNIFICANT CHANGE UP (ref 3.5–5.3)
POTASSIUM SERPL-SCNC: 4.3 MMOL/L — SIGNIFICANT CHANGE UP (ref 3.5–5.3)
RBC # BLD: 4.13 M/UL — LOW (ref 4.2–5.8)
RBC # FLD: 12.8 % — SIGNIFICANT CHANGE UP (ref 10.3–14.5)
SODIUM SERPL-SCNC: 137 MMOL/L — SIGNIFICANT CHANGE UP (ref 135–145)
WBC # BLD: 6.99 K/UL — SIGNIFICANT CHANGE UP (ref 3.8–10.5)
WBC # FLD AUTO: 6.99 K/UL — SIGNIFICANT CHANGE UP (ref 3.8–10.5)

## 2025-02-26 PROCEDURE — 80048 BASIC METABOLIC PNL TOTAL CA: CPT

## 2025-02-26 PROCEDURE — 85027 COMPLETE CBC AUTOMATED: CPT

## 2025-02-26 PROCEDURE — 73700 CT LOWER EXTREMITY W/O DYE: CPT | Mod: 26,RT

## 2025-02-26 PROCEDURE — 87641 MR-STAPH DNA AMP PROBE: CPT

## 2025-02-26 PROCEDURE — 83036 HEMOGLOBIN GLYCOSYLATED A1C: CPT

## 2025-02-26 PROCEDURE — G0463: CPT

## 2025-02-26 PROCEDURE — 87640 STAPH A DNA AMP PROBE: CPT

## 2025-02-26 PROCEDURE — 73700 CT LOWER EXTREMITY W/O DYE: CPT | Mod: MC

## 2025-02-26 NOTE — H&P PST ADULT - HISTORY OF PRESENT ILLNESS
63 year old male with pmhx LUZMA on CPAP, DM, OA, Patella fracture. PShx multiple Lumbar spine procedures including lumbar fusion and discectomy.  C/o right pain 8/10 for the past year that has worsen overtime. The pain is exacerbated with knee flexion, stairs and prolonged weight bearing. Pain is better with rest. His last right knee cortisone injection was on 12/13/24 and right knee Monovisc injection on 12/23/24. He denies any recent trauma to knee. Denies any chest pain, palpitations, SOB, N/V, fever or chills. He now presents to PST prior to scheduled Right Total Knee Arthroplasty with Cedric Rebolledo with Dr. Aragon on 3/18/25.

## 2025-02-26 NOTE — H&P PST ADULT - NSICDXPASTMEDICALHX_GEN_ALL_CORE_FT
PAST MEDICAL HISTORY:  DM (diabetes mellitus)     OA (osteoarthritis)     LUZMA on CPAP     Patella fracture

## 2025-02-26 NOTE — H&P PST ADULT - ASSESSMENT
DASI Score: 5.72  DASI Activity: pt drives, walks with a cane, performs all ADL's without assistance, able to go up one flight of stairs or walk 1-2 blocks without difficulty  Loose or removable teeth: denies   CAPRINI SCORE    AGE RELATED RISK FACTORS                                                             [ ] Age 41-60 years                                            (1 Point)  [ x] Age: 61-74 years                                           (2 Points)                 [ ] Age= 75 years                                                (3 Points)             DISEASE RELATED RISK FACTORS                                                       [ ] Edema in the lower extremities                 (1 Point)                     [ ] Varicose veins                                               (1 Point)                                 [ x] BMI > 25 Kg/m2                                            (1 Point)                                  [ ] Serious infection (ie PNA)                            (1 Point)                     [ ] Lung disease ( COPD, Emphysema)            (1 Point)                                                                          [ ] Acute myocardial infarction                         (1 Point)                  [ ] Congestive heart failure (in the previous month)  (1 Point)         [ ] Inflammatory bowel disease                            (1 Point)                  [ ] Central venous access, PICC or Port               (2 points)       (within the last month)                                                                [ ] Stroke (in the previous month)                        (5 Points)    [ ] Previous or present malignancy                       (2 points)                                                                                                                                                         HEMATOLOGY RELATED FACTORS                                                         [ ] Prior episodes of VTE                                     (3 Points)                     [ ] Positive family history for VTE                      (3 Points)                  [ ] Prothrombin 04823 A                                     (3 Points)                     [ ] Factor V Leiden                                                (3 Points)                        [ ] Lupus anticoagulants                                      (3 Points)                                                           [ ] Anticardiolipin antibodies                              (3 Points)                                                       [ ] High homocysteine in the blood                   (3 Points)                                             [ ] Other congenital or acquired thrombophilia      (3 Points)                                                [ ] Heparin induced thrombocytopenia                  (3 Points)                                        MOBILITY RELATED FACTORS  [ ] Bed rest                                                         (1 Point)  [ ] Plaster cast                                                    (2 points)  [ ] Bed bound for more than 72 hours           (2 Points)    GENDER SPECIFIC FACTORS  [ ] Pregnancy or had a baby within the last month   (1 Point)  [ ] Post-partum < 6 weeks                                   (1 Point)  [ ] Hormonal therapy  or oral contraception   (1 Point)  [ ] History of pregnancy complications              (1 point)  [ ] Unexplained or recurrent              (1 Point)    OTHER RISK FACTORS                                           (1 Point)  [ ] BMI >40, smoking, diabetes requiring insulin, chemotherapy  blood transfusions and length of surgery over 2 hours    SURGERY RELATED RISK FACTORS  [ ]  Section within the last month     (1 Point)  [ ] Minor surgery                                                  (1 Point)  [ ] Arthroscopic surgery                                       (2 Points)  [ ] Planned major surgery lasting more            (2 Points)      than 45 minutes     [x ] Elective hip or knee joint replacement       (5 points)       surgery                                                TRAUMA RELATED RISK FACTORS  [ ] Fracture of the hip, pelvis, or leg                       (5 Points)  [ ] Spinal cord injury resulting in paralysis             (5 points)       (in the previous month)    [ ] Paralysis  (less than 1 month)                             (5 Points)  [ ] Multiple Trauma within 1 month                        (5 Points)    Total Score [8 ]    Caprini Score 0-2: Low Risk, NO VTE prophylaxis required for most patients, encourage ambulation  Caprini Score 3-6: Moderate Risk , pharmacologic VTE prophylaxis is indicated for most patients (in the absence of contraindications)  Caprini Score Greater than or =7: High risk, pharmocologic VTE prophylaxis indicated for most patients (in the absence of contraindications)

## 2025-02-26 NOTE — H&P PST ADULT - PROBLEM SELECTOR PLAN 1
Pt. scheduled for Right Total Knee Arthroplasty with Cedric Robot with Dr. Aragon on 3/18/25.  Pre-op instructions given, all questions answered.  Surgical Soap given.  Labs: CBC, BMP, A1C, MRSA, CT right knee

## 2025-02-27 PROBLEM — R73.03 PREDIABETES: Chronic | Status: INACTIVE | Noted: 2020-12-02 | Resolved: 2025-02-26

## 2025-02-27 LAB
A1C WITH ESTIMATED AVERAGE GLUCOSE RESULT: 8.3 % — HIGH (ref 4–5.6)
ESTIMATED AVERAGE GLUCOSE: 192 MG/DL — HIGH (ref 68–114)
MRSA PCR RESULT.: SIGNIFICANT CHANGE UP
S AUREUS DNA NOSE QL NAA+PROBE: SIGNIFICANT CHANGE UP

## 2025-03-14 ENCOUNTER — NON-APPOINTMENT (OUTPATIENT)
Age: 65
End: 2025-03-14

## 2025-03-18 ENCOUNTER — TRANSCRIPTION ENCOUNTER (OUTPATIENT)
Age: 65
End: 2025-03-18

## 2025-03-18 ENCOUNTER — APPOINTMENT (OUTPATIENT)
Dept: ORTHOPEDIC SURGERY | Facility: HOSPITAL | Age: 65
End: 2025-03-18

## 2025-03-18 ENCOUNTER — OUTPATIENT (OUTPATIENT)
Dept: INPATIENT UNIT | Facility: HOSPITAL | Age: 65
LOS: 1 days | End: 2025-03-18
Payer: MEDICARE

## 2025-03-18 VITALS
HEART RATE: 78 BPM | DIASTOLIC BLOOD PRESSURE: 72 MMHG | RESPIRATION RATE: 17 BRPM | TEMPERATURE: 98 F | SYSTOLIC BLOOD PRESSURE: 118 MMHG | OXYGEN SATURATION: 97 %

## 2025-03-18 DIAGNOSIS — M17.11 UNILATERAL PRIMARY OSTEOARTHRITIS, RIGHT KNEE: ICD-10-CM

## 2025-03-18 DIAGNOSIS — Z98.1 ARTHRODESIS STATUS: Chronic | ICD-10-CM

## 2025-03-18 DIAGNOSIS — Z01.818 ENCOUNTER FOR OTHER PREPROCEDURAL EXAMINATION: ICD-10-CM

## 2025-03-18 DIAGNOSIS — Z98.890 OTHER SPECIFIED POSTPROCEDURAL STATES: Chronic | ICD-10-CM

## 2025-03-18 LAB
GLUCOSE BLDC GLUCOMTR-MCNC: 246 MG/DL — HIGH (ref 70–99)
GLUCOSE BLDC GLUCOMTR-MCNC: 292 MG/DL — HIGH (ref 70–99)

## 2025-03-18 PROCEDURE — 73562 X-RAY EXAM OF KNEE 3: CPT | Mod: 26,RT

## 2025-03-18 DEVICE — PATELLA TRIATHLON ASSYM SZ A3X10MM: Type: IMPLANTABLE DEVICE | Site: RIGHT | Status: FUNCTIONAL

## 2025-03-18 DEVICE — COMP FEM CR CMNTLSS BEADED W/ PA SZ 6 RT: Type: IMPLANTABLE DEVICE | Site: RIGHT | Status: FUNCTIONAL

## 2025-03-18 DEVICE — INSERT TIB BEARING CS X3 SZ 6 11MM: Type: IMPLANTABLE DEVICE | Site: RIGHT | Status: FUNCTIONAL

## 2025-03-18 DEVICE — MAKO BONE PIN 4MM X 110MM: Type: IMPLANTABLE DEVICE | Site: RIGHT | Status: FUNCTIONAL

## 2025-03-18 DEVICE — BASEPLATE TIB TRIATHLON TRITAN SZ 6: Type: IMPLANTABLE DEVICE | Site: RIGHT | Status: FUNCTIONAL

## 2025-03-18 DEVICE — MAKO BONE PIN 4MM X 140MM: Type: IMPLANTABLE DEVICE | Site: RIGHT | Status: FUNCTIONAL

## 2025-03-18 RX ORDER — DEXTROSE 50 % IN WATER 50 %
25 SYRINGE (ML) INTRAVENOUS ONCE
Refills: 0 | Status: DISCONTINUED | OUTPATIENT
Start: 2025-03-18 | End: 2025-03-19

## 2025-03-18 RX ORDER — SODIUM CHLORIDE 9 G/1000ML
1000 INJECTION, SOLUTION INTRAVENOUS
Refills: 0 | Status: DISCONTINUED | OUTPATIENT
Start: 2025-03-18 | End: 2025-03-19

## 2025-03-18 RX ORDER — ACETAMINOPHEN 500 MG/5ML
1000 LIQUID (ML) ORAL EVERY 8 HOURS
Refills: 0 | Status: COMPLETED | OUTPATIENT
Start: 2025-03-18 | End: 2025-03-18

## 2025-03-18 RX ORDER — ONDANSETRON HCL/PF 4 MG/2 ML
4 VIAL (ML) INJECTION EVERY 6 HOURS
Refills: 0 | Status: DISCONTINUED | OUTPATIENT
Start: 2025-03-18 | End: 2025-03-19

## 2025-03-18 RX ORDER — TRAMADOL HYDROCHLORIDE 50 MG/1
50 TABLET, FILM COATED ORAL EVERY 6 HOURS
Refills: 0 | Status: DISCONTINUED | OUTPATIENT
Start: 2025-03-18 | End: 2025-03-19

## 2025-03-18 RX ORDER — MAGNESIUM HYDROXIDE 400 MG/5ML
30 SUSPENSION ORAL DAILY
Refills: 0 | Status: DISCONTINUED | OUTPATIENT
Start: 2025-03-18 | End: 2025-03-19

## 2025-03-18 RX ORDER — CEFAZOLIN SODIUM IN 0.9 % NACL 3 G/100 ML
2000 INTRAVENOUS SOLUTION, PIGGYBACK (ML) INTRAVENOUS ONCE
Refills: 0 | Status: COMPLETED | OUTPATIENT
Start: 2025-03-18 | End: 2025-03-18

## 2025-03-18 RX ORDER — INSULIN LISPRO 100 U/ML
INJECTION, SOLUTION INTRAVENOUS; SUBCUTANEOUS
Refills: 0 | Status: DISCONTINUED | OUTPATIENT
Start: 2025-03-18 | End: 2025-03-19

## 2025-03-18 RX ORDER — ONDANSETRON HCL/PF 4 MG/2 ML
4 VIAL (ML) INJECTION ONCE
Refills: 0 | Status: DISCONTINUED | OUTPATIENT
Start: 2025-03-18 | End: 2025-03-18

## 2025-03-18 RX ORDER — CEFAZOLIN SODIUM IN 0.9 % NACL 3 G/100 ML
2000 INTRAVENOUS SOLUTION, PIGGYBACK (ML) INTRAVENOUS EVERY 8 HOURS
Refills: 0 | Status: COMPLETED | OUTPATIENT
Start: 2025-03-18 | End: 2025-03-19

## 2025-03-18 RX ORDER — GLUCAGON 3 MG/1
1 POWDER NASAL ONCE
Refills: 0 | Status: DISCONTINUED | OUTPATIENT
Start: 2025-03-18 | End: 2025-03-19

## 2025-03-18 RX ORDER — HYDROMORPHONE/SOD CHLOR,ISO/PF 2 MG/10 ML
0.5 SYRINGE (ML) INJECTION
Refills: 0 | Status: DISCONTINUED | OUTPATIENT
Start: 2025-03-18 | End: 2025-03-18

## 2025-03-18 RX ORDER — TRAMADOL HYDROCHLORIDE 50 MG/1
50 TABLET, FILM COATED ORAL ONCE
Refills: 0 | Status: DISCONTINUED | OUTPATIENT
Start: 2025-03-18 | End: 2025-03-18

## 2025-03-18 RX ORDER — SENNA 187 MG
2 TABLET ORAL AT BEDTIME
Refills: 0 | Status: DISCONTINUED | OUTPATIENT
Start: 2025-03-18 | End: 2025-03-19

## 2025-03-18 RX ORDER — KETOROLAC TROMETHAMINE 30 MG/ML
30 INJECTION, SOLUTION INTRAMUSCULAR; INTRAVENOUS EVERY 6 HOURS
Refills: 0 | Status: DISCONTINUED | OUTPATIENT
Start: 2025-03-18 | End: 2025-03-19

## 2025-03-18 RX ORDER — CELECOXIB 50 MG/1
200 CAPSULE ORAL EVERY 12 HOURS
Refills: 0 | Status: DISCONTINUED | OUTPATIENT
Start: 2025-03-19 | End: 2025-03-19

## 2025-03-18 RX ORDER — DEXTROSE 50 % IN WATER 50 %
15 SYRINGE (ML) INTRAVENOUS ONCE
Refills: 0 | Status: DISCONTINUED | OUTPATIENT
Start: 2025-03-18 | End: 2025-03-19

## 2025-03-18 RX ORDER — DEXTROSE 50 % IN WATER 50 %
12.5 SYRINGE (ML) INTRAVENOUS ONCE
Refills: 0 | Status: DISCONTINUED | OUTPATIENT
Start: 2025-03-18 | End: 2025-03-19

## 2025-03-18 RX ORDER — OXYCODONE HYDROCHLORIDE 30 MG/1
5 TABLET ORAL EVERY 4 HOURS
Refills: 0 | Status: DISCONTINUED | OUTPATIENT
Start: 2025-03-18 | End: 2025-03-19

## 2025-03-18 RX ORDER — ACETAMINOPHEN 500 MG/5ML
1000 LIQUID (ML) ORAL ONCE
Refills: 0 | Status: COMPLETED | OUTPATIENT
Start: 2025-03-19 | End: 2025-03-19

## 2025-03-18 RX ORDER — ACETAMINOPHEN 500 MG/5ML
975 LIQUID (ML) ORAL EVERY 8 HOURS
Refills: 0 | Status: DISCONTINUED | OUTPATIENT
Start: 2025-03-19 | End: 2025-03-19

## 2025-03-18 RX ORDER — SODIUM CHLORIDE 9 G/1000ML
1000 INJECTION, SOLUTION INTRAVENOUS
Refills: 0 | Status: DISCONTINUED | OUTPATIENT
Start: 2025-03-18 | End: 2025-03-18

## 2025-03-18 RX ORDER — INFLUENZA A VIRUS A/IDAHO/07/2018 (H1N1) ANTIGEN (MDCK CELL DERIVED, PROPIOLACTONE INACTIVATED, INFLUENZA A VIRUS A/INDIANA/08/2018 (H3N2) ANTIGEN (MDCK CELL DERIVED, PROPIOLACTONE INACTIVATED), INFLUENZA B VIRUS B/SINGAPORE/INFTT-16-0610/2016 ANTIGEN (MDCK CELL DERIVED, PROPIOLACTONE INACTIVATED), INFLUENZA B VIRUS B/IOWA/06/2017 ANTIGEN (MDCK CELL DERIVED, PROPIOLACTONE INACTIVATED) 15; 15; 15; 15 UG/.5ML; UG/.5ML; UG/.5ML; UG/.5ML
0.5 INJECTION, SUSPENSION INTRAMUSCULAR ONCE
Refills: 0 | Status: DISCONTINUED | OUTPATIENT
Start: 2025-03-18 | End: 2025-03-19

## 2025-03-18 RX ORDER — POLYETHYLENE GLYCOL 3350 17 G/17G
17 POWDER, FOR SOLUTION ORAL AT BEDTIME
Refills: 0 | Status: DISCONTINUED | OUTPATIENT
Start: 2025-03-18 | End: 2025-03-19

## 2025-03-18 RX ORDER — LIDOCAINE HCL/PF 10 MG/ML
0.2 VIAL (ML) INJECTION ONCE
Refills: 0 | Status: DISCONTINUED | OUTPATIENT
Start: 2025-03-18 | End: 2025-03-18

## 2025-03-18 RX ORDER — OXYCODONE HYDROCHLORIDE 30 MG/1
10 TABLET ORAL EVERY 4 HOURS
Refills: 0 | Status: DISCONTINUED | OUTPATIENT
Start: 2025-03-18 | End: 2025-03-19

## 2025-03-18 RX ORDER — HYDROMORPHONE/SOD CHLOR,ISO/PF 2 MG/10 ML
0.5 SYRINGE (ML) INJECTION ONCE
Refills: 0 | Status: DISCONTINUED | OUTPATIENT
Start: 2025-03-18 | End: 2025-03-19

## 2025-03-18 RX ORDER — INSULIN LISPRO 100 U/ML
INJECTION, SOLUTION INTRAVENOUS; SUBCUTANEOUS AT BEDTIME
Refills: 0 | Status: DISCONTINUED | OUTPATIENT
Start: 2025-03-18 | End: 2025-03-19

## 2025-03-18 RX ORDER — ASPIRIN 325 MG
81 TABLET ORAL
Refills: 0 | Status: DISCONTINUED | OUTPATIENT
Start: 2025-03-18 | End: 2025-03-19

## 2025-03-18 RX ORDER — SITAGLIPTIN AND METFORMIN HYDROCHLORIDE 1000; 50 MG/1; MG/1
1 TABLET, FILM COATED ORAL
Refills: 0 | DISCHARGE

## 2025-03-18 RX ADMIN — KETOROLAC TROMETHAMINE 30 MILLIGRAM(S): 30 INJECTION, SOLUTION INTRAMUSCULAR; INTRAVENOUS at 19:10

## 2025-03-18 RX ADMIN — Medication 40 MILLIGRAM(S): at 12:14

## 2025-03-18 RX ADMIN — INSULIN LISPRO 2: 100 INJECTION, SOLUTION INTRAVENOUS; SUBCUTANEOUS at 16:10

## 2025-03-18 RX ADMIN — Medication 500 MILLILITER(S): at 18:50

## 2025-03-18 RX ADMIN — Medication 2 TABLET(S): at 21:57

## 2025-03-18 RX ADMIN — TRAMADOL HYDROCHLORIDE 50 MILLIGRAM(S): 50 TABLET, FILM COATED ORAL at 12:30

## 2025-03-18 RX ADMIN — Medication 0.5 MILLIGRAM(S): at 15:55

## 2025-03-18 RX ADMIN — Medication 500 MILLILITER(S): at 15:52

## 2025-03-18 RX ADMIN — SODIUM CHLORIDE 75 MILLILITER(S): 9 INJECTION, SOLUTION INTRAVENOUS at 18:49

## 2025-03-18 RX ADMIN — TRAMADOL HYDROCHLORIDE 50 MILLIGRAM(S): 50 TABLET, FILM COATED ORAL at 12:14

## 2025-03-18 RX ADMIN — OXYCODONE HYDROCHLORIDE 5 MILLIGRAM(S): 30 TABLET ORAL at 16:25

## 2025-03-18 RX ADMIN — Medication 100 MILLIGRAM(S): at 21:57

## 2025-03-18 RX ADMIN — Medication 81 MILLIGRAM(S): at 17:51

## 2025-03-18 RX ADMIN — POLYETHYLENE GLYCOL 3350 17 GRAM(S): 17 POWDER, FOR SOLUTION ORAL at 21:57

## 2025-03-18 RX ADMIN — Medication 0.5 MILLIGRAM(S): at 15:40

## 2025-03-18 RX ADMIN — Medication 400 MILLIGRAM(S): at 21:56

## 2025-03-18 RX ADMIN — INSULIN LISPRO 1: 100 INJECTION, SOLUTION INTRAVENOUS; SUBCUTANEOUS at 22:06

## 2025-03-18 RX ADMIN — OXYCODONE HYDROCHLORIDE 5 MILLIGRAM(S): 30 TABLET ORAL at 16:55

## 2025-03-18 NOTE — DISCHARGE NOTE PROVIDER - HOSPITAL COURSE
History of Present Illness	  63 year old male with pmhx LUZMA on CPAP, DM, OA, Patella fracture. PShx multiple Lumbar spine procedures including lumbar fusion and discectomy.  C/o right pain 8/10 for the past year that has worsen overtime. The pain is exacerbated with knee flexion, stairs and prolonged weight bearing. Pain is better with rest. His last right knee cortisone injection was on 12/13/24 and right knee Monovisc injection on 12/23/24. He denies any recent trauma to knee. Denies any chest pain, palpitations, SOB, N/V, fever or chills. He now presents to PST prior to scheduled Right Total Knee Arthroplasty with Cedric Robot with Dr. Aragon on 3/18/25.    GOC: pt wants to be able to walk without pain    Allergies:   No Known Allergies:     PAST MEDICAL HISTORY:  DM (diabetes mellitus)   OA (osteoarthritis)   LUZMA on CPAP   Patella fracture.     PAST SURGICAL HISTORY:  discectomy   spinal fusion lumbar spinal fusion, spinal fixation device, discectomy.    Hospital Course:  After admission on 3/18/2025 and receiving pre-operative parenteral prophylactic antibiotics, the patient underwent an uncomplicated Right total knee replacement with CEDRIC robotic assist with Dr. Aragon. Patient tolerated the procedure well and was transferred to the recovery room in stable condition, with a stable neuro/vascular exam of the operated extremity.    Patient was placed on Ecotrin 81mg twice daily for DVT ppx, and was placed on Protonix for GI protection.   Patient was made weight bearing as tolerated with the operative leg.     Typical Physical & occupational therapy modalities post surgery were performed by physical and occupational therapies, including ambulation training, range of motion, ADL's, and transfers.  After progression of mobility guided by the PT/ OT staff,  the patient was felt to benefit from further rehabilitative care for restoration to level of function. This was felt to best be accomplished at home with home PT.  Discharge and Orthopedic Care instructions were delineated in the Discharge Plan and reviewed with the patient. All medications were delineated in the medication reconciliation tool and key points were reviewed with the patient. They were deemed stable from an Orthopedic & medical standpoint for discharge.
warm

## 2025-03-18 NOTE — PATIENT PROFILE ADULT - PATIENT’S MOTHER’S MAIDEN LAST NAME (INFO USED BY THE IMMUNIZATION REGISTRY):
History   Administered Date(s) Administered    COVID-19, MODERNA BLUE border, Primary or Immunocompromised, (age 12y+), IM, 100 mcg/0.5mL 07/30/2021, 08/27/2021    Influenza, FLUARIX, FLULAVAL, FLUZONE (age 6 mo+) AND AFLURIA, (age 3 y+), PF, 0.5mL 12/05/2019    Influenza, FLUBLOK, (age 18 y+), PF, 0.5mL 02/05/2021, 12/02/2021    Influenza, FLUCELVAX, (age 6 mo+), MDCK, MDV, 0.5mL 09/20/2018        Health Maintenance   Topic Date Due    DTaP/Tdap/Td vaccine (1 - Tdap) Never done    Cervical cancer screen  06/05/2018    Shingles vaccine (1 of 2) Never done    Colorectal Cancer Screen  03/05/2022    COVID-19 Vaccine (3 - 2023-24 season) 09/01/2023    Depression Monitoring  09/22/2023    Hepatitis B vaccine (1 of 3 - 3-dose series) 04/23/2025 (Originally 1970)    Flu vaccine (Season Ended) 08/01/2024    Breast cancer screen  09/08/2024    Lipids  09/22/2027    Hepatitis A vaccine  Aged Out    Hib vaccine  Aged Out    Polio vaccine  Aged Out    Meningococcal (ACWY) vaccine  Aged Out    Pneumococcal 0-64 years Vaccine  Aged Out    Diabetes screen  Discontinued    Hepatitis C screen  Discontinued    HIV screen  Discontinued     Recommendations for Preventive Services Due: see orders and patient instructions/AVS.    Return in about 6 months (around 10/23/2024) for htn.   N/A

## 2025-03-18 NOTE — PHYSICAL THERAPY INITIAL EVALUATION ADULT - GAIT DEVIATIONS NOTED, PT EVAL
decreased miles/decreased velocity of limb motion/decreased step length/decreased weight-shifting ability
Socorro Sethi (MD)  Obstetrics and Gynecology  [facility] [place] [address]  Phone: [phone]  Fax: [phone]  Follow Up Time:

## 2025-03-18 NOTE — PHYSICAL THERAPY INITIAL EVALUATION ADULT - ADDITIONAL COMMENTS
Patient lives with wife in a private house with 10STE w/B/L handrails and 5 steps w/B/L handrails to bedroom & 5 steps to basement. PTA, pt. was independent in mobility & ADLs, reported he recently started using SC 2/2 pain; Pt. has walk-in shower and RW at home.

## 2025-03-18 NOTE — CHART NOTE - NSCHARTNOTEFT_GEN_A_CORE
Post-Operative Check    Pt evaluated in RR resting without complaints. Pt states pain is well tolerated. No Chest Pain, SOB, N/V.    Vitals:  T(C): 36.6 (03-18-25 @ 15:15), Max: 36.7 (03-18-25 @ 10:25)  HR: 98 (03-18-25 @ 16:30) (78 - 98)  BP: 153/73 (03-18-25 @ 16:30) (118/72 - 179/93)  RR: 16 (03-18-25 @ 16:30) (14 - 17)  SpO2: 100% (03-18-25 @ 16:30) (97% - 100%)    Exam:  Alert and Oriented, No Acute Distress. VSS.   Laterality: RLE     Mepilex dressing is clean, dry and intact.      (+) PF/DF/EHL/FHL 5/5     Sensation intact to light touch      2+ DP/PT pulse  Calves soft, non-tender bilaterally    Xray: < from: Xray Knee 3 Views, Right (03.18.25 @ 15:44) >    IMPRESSION:     Unconstrained right total knee prosthesis implanted.     Intact and aligned hardware and no periprosthetic fractures.     Postoperative soft tissue changes.     Correlate with intraoperative findings.    A/P: 64y Male s/p Right Total Knee Arthroplasty with TYSHAWN robotic assist. VSS. NAD  -PT/OT: WBAT/OOB  -IS bedside  -Ice/elevation   -DVT PPx: Aspirin 81mg BID, SCD, Early OOB and Amb  -GI PPx: Protonix 40mg   -Pain Control  -Bowel regimen  -Continue post-op abx x 24hrs  -f/u AM labs.   -LUZMA     -CPAP ordered  -DM     -Hold home meds     -consistent carb diet     -low dose insulin sliding scale  -Dispo planning: PACU to Floor, pending PT/OT eval.     Chuck Gaytan PA-C  Orthopedic Surgery Team  Team Pager #[phone]

## 2025-03-18 NOTE — DISCHARGE NOTE PROVIDER - CARE PROVIDER_API CALL
Emani Aragon  Orthopaedic Surgery  [address], [address]  [place] [address]  Phone: [phone]  Fax: [phone]  Follow Up Time: 2 weeks

## 2025-03-18 NOTE — DISCHARGE NOTE PROVIDER - NSDCFUADDINST_GEN_ALL_CORE_FT
Please call to schedule your post-operative follow up appointment with Dr. Aragon for 2 weeks after hospital discharge.   Keep dressing clean, dry, and intact. Have doctor remove bandage at your post-operative follow up appointment.   Shower: with assistance. keep the dressing away from the stream of water. Pat the dressing dry when coming out of the shower. no tub baths.   Continue to ambulate as tolerated with a rolling walker.   Continue to take Aspirin 81mg twice daily x 6 weeks to help prevent blood clots. Please continue taking Protonix 40mg daily while taking aspirin for gastrointestinal protection.   Recommended to follow up with your primary care provider within 1-2 months of hospital discharge to discuss your recent surgery and any change to your medications.

## 2025-03-18 NOTE — PHYSICAL THERAPY INITIAL EVALUATION ADULT - PERTINENT HX OF CURRENT PROBLEM, REHAB EVAL
63 year old male with pmhx LUZMA on CPAP, DM, OA, Patella fracture. PShx multiple Lumbar spine procedures including lumbar fusion and discectomy.  C/o right pain 8/10 for the past year that has worsen overtime. The pain is exacerbated with knee flexion, stairs and prolonged weight bearing. Pain is better with rest. His last right knee cortisone injection was on 12/13/24 and right knee Monovisc injection on 12/23/24. He denies any recent trauma to knee. Denies any chest pain, palpitations, SOB, N/V, fever or chills. He now presents to PST prior to scheduled Right Total Knee Arthroplasty with Cedric Robot with Dr. Aragon on 3/18/25. Hospital course: S/P R TKA (3/18)

## 2025-03-18 NOTE — PATIENT PROFILE ADULT - FALL HARM RISK - RISK INTERVENTIONS

## 2025-03-18 NOTE — DISCHARGE NOTE PROVIDER - NSDCFUSCHEDAPPT_GEN_ALL_CORE_FT
Brenden Argueta  [facility]  PULED [address] [facility]  Scheduled Appointment: 04/10/2025

## 2025-03-18 NOTE — DISCHARGE NOTE PROVIDER - NSDCMRMEDTOKEN_GEN_ALL_CORE_FT
diclofenac sodium 75 mg oral delayed release tablet: 1 tab(s) orally 2 times a day  Janumet 50 mg-1000 mg oral tablet: 1 tab(s) orally once a day  Tylenol Arthritis Extended Release 650 mg oral tablet, extended release: 2 tab(s) orally prn   acetaminophen 325 mg oral tablet: 3 tab(s) orally every 8 hours  aspirin 81 mg oral delayed release tablet: 1 tab(s) orally 2 times a day  Janumet 50 mg-1000 mg oral tablet: 1 tab(s) orally once a day  oxyCODONE 5 mg oral tablet: 1 tab(s) orally every 4 hours As needed Moderate Pain (4 - 6)  pantoprazole 40 mg oral delayed release tablet: 1 tab(s) orally once a day (before a meal)  senna leaf extract oral tablet: 2 tab(s) orally once a day (at bedtime)  traMADol 50 mg oral tablet: 1 tab(s) orally every 6 hours As needed Mild Pain (1 - 3)   Janumet 50 mg-1000 mg oral tablet: 1 tab(s) orally once a day   acetaminophen 325 mg oral tablet: 3 tab(s) orally every 8 hours X 5 days, then as needed for mild pain MDD: 009  aspirin 81 mg oral delayed release tablet: 1 tab(s) orally 2 times a day X 6 weeks for blood clot prevention, then stop MDD: 002  docusate sodium 100 mg oral tablet: 1 tab(s) orally 2 times a day to prevent constipation MDD: 002  Janumet 50 mg-1000 mg oral tablet: 1 tab(s) orally once a day  naproxen 500 mg oral tablet: 1 tab(s) orally 2 times a day X 2 weeks for pain control, then stop MDD: 002  Narcan 4 mg/0.1 mL nasal spray: 1 spray(s) intranasally every 2 to 3 minutes alternating between nostrils in event of narcotic overdose  oxyCODONE 5 mg oral tablet: 1 tab(s) orally every 4 hours as needed for Moderate Pain (4 - 6) ; 2 Tabs PO Q4H PRN Severe Pain MDD: 006  pantoprazole 40 mg oral delayed release tablet: 1 tab(s) orally once a day before breakfast for stomach protection MDD: 001  senna leaf extract oral tablet: 2 tab(s) orally once a day (at bedtime) for constipation prevention MDD: 002  traMADol 50 mg oral tablet: 1 tab(s) orally every 6 hours as needed for Mild Pain (1 - 3) MDD: 004

## 2025-03-19 ENCOUNTER — TRANSCRIPTION ENCOUNTER (OUTPATIENT)
Age: 65
End: 2025-03-19

## 2025-03-19 VITALS
RESPIRATION RATE: 18 BRPM | DIASTOLIC BLOOD PRESSURE: 72 MMHG | OXYGEN SATURATION: 98 % | TEMPERATURE: 99 F | HEART RATE: 95 BPM | SYSTOLIC BLOOD PRESSURE: 132 MMHG

## 2025-03-19 LAB
ANION GAP SERPL CALC-SCNC: 13 MMOL/L — SIGNIFICANT CHANGE UP (ref 5–17)
BUN SERPL-MCNC: 12 MG/DL — SIGNIFICANT CHANGE UP (ref 7–23)
CALCIUM SERPL-MCNC: 8.7 MG/DL — SIGNIFICANT CHANGE UP (ref 8.4–10.5)
CHLORIDE SERPL-SCNC: 99 MMOL/L — SIGNIFICANT CHANGE UP (ref 96–108)
CO2 SERPL-SCNC: 25 MMOL/L — SIGNIFICANT CHANGE UP (ref 22–31)
CREAT SERPL-MCNC: 0.77 MG/DL — SIGNIFICANT CHANGE UP (ref 0.5–1.3)
EGFR: 99 ML/MIN/1.73M2 — SIGNIFICANT CHANGE UP
EGFR: 99 ML/MIN/1.73M2 — SIGNIFICANT CHANGE UP
GLUCOSE BLDC GLUCOMTR-MCNC: 186 MG/DL — HIGH (ref 70–99)
GLUCOSE BLDC GLUCOMTR-MCNC: 280 MG/DL — HIGH (ref 70–99)
GLUCOSE SERPL-MCNC: 186 MG/DL — HIGH (ref 70–99)
HCT VFR BLD CALC: 28.9 % — LOW (ref 39–50)
HGB BLD-MCNC: 10 G/DL — LOW (ref 13–17)
MCHC RBC-ENTMCNC: 29.7 PG — SIGNIFICANT CHANGE UP (ref 27–34)
MCHC RBC-ENTMCNC: 34.6 G/DL — SIGNIFICANT CHANGE UP (ref 32–36)
MCV RBC AUTO: 85.8 FL — SIGNIFICANT CHANGE UP (ref 80–100)
NRBC BLD AUTO-RTO: 0 /100 WBCS — SIGNIFICANT CHANGE UP (ref 0–0)
PLATELET # BLD AUTO: 223 K/UL — SIGNIFICANT CHANGE UP (ref 150–400)
POTASSIUM SERPL-MCNC: 3.8 MMOL/L — SIGNIFICANT CHANGE UP (ref 3.5–5.3)
POTASSIUM SERPL-SCNC: 3.8 MMOL/L — SIGNIFICANT CHANGE UP (ref 3.5–5.3)
RBC # BLD: 3.37 M/UL — LOW (ref 4.2–5.8)
RBC # FLD: 13.2 % — SIGNIFICANT CHANGE UP (ref 10.3–14.5)
SODIUM SERPL-SCNC: 137 MMOL/L — SIGNIFICANT CHANGE UP (ref 135–145)
WBC # BLD: 12.21 K/UL — HIGH (ref 3.8–10.5)
WBC # FLD AUTO: 12.21 K/UL — HIGH (ref 3.8–10.5)

## 2025-03-19 PROCEDURE — C9399: CPT

## 2025-03-19 PROCEDURE — 36415 COLL VENOUS BLD VENIPUNCTURE: CPT

## 2025-03-19 PROCEDURE — 97112 NEUROMUSCULAR REEDUCATION: CPT

## 2025-03-19 PROCEDURE — 97161 PT EVAL LOW COMPLEX 20 MIN: CPT

## 2025-03-19 PROCEDURE — 20985 CPTR-ASST DIR MS PX: CPT

## 2025-03-19 PROCEDURE — 80048 BASIC METABOLIC PNL TOTAL CA: CPT

## 2025-03-19 PROCEDURE — 97165 OT EVAL LOW COMPLEX 30 MIN: CPT

## 2025-03-19 PROCEDURE — 85027 COMPLETE CBC AUTOMATED: CPT

## 2025-03-19 PROCEDURE — C1713: CPT

## 2025-03-19 PROCEDURE — 94660 CPAP INITIATION&MGMT: CPT

## 2025-03-19 PROCEDURE — S2900: CPT

## 2025-03-19 PROCEDURE — C1776: CPT

## 2025-03-19 PROCEDURE — 73562 X-RAY EXAM OF KNEE 3: CPT

## 2025-03-19 PROCEDURE — 97116 GAIT TRAINING THERAPY: CPT

## 2025-03-19 PROCEDURE — 27447 TOTAL KNEE ARTHROPLASTY: CPT | Mod: RT

## 2025-03-19 PROCEDURE — 82962 GLUCOSE BLOOD TEST: CPT

## 2025-03-19 RX ORDER — DOCUSATE SODIUM 100 MG
1 CAPSULE ORAL
Qty: 60 | Refills: 0
Start: 2025-03-19 | End: 2025-04-17

## 2025-03-19 RX ORDER — NALOXONE HYDROCHLORIDE 0.4 MG/ML
1 INJECTION, SOLUTION INTRAMUSCULAR; INTRAVENOUS; SUBCUTANEOUS
Qty: 1 | Refills: 0
Start: 2025-03-19

## 2025-03-19 RX ORDER — OXYCODONE HYDROCHLORIDE 30 MG/1
1 TABLET ORAL
Qty: 0 | Refills: 0 | DISCHARGE
Start: 2025-03-19

## 2025-03-19 RX ORDER — ACETAMINOPHEN 500 MG/5ML
2 LIQUID (ML) ORAL
Refills: 0 | DISCHARGE

## 2025-03-19 RX ORDER — NAPROXEN SODIUM 275 MG
1 TABLET ORAL
Qty: 28 | Refills: 0
Start: 2025-03-19 | End: 2025-04-01

## 2025-03-19 RX ORDER — SENNA 187 MG
2 TABLET ORAL
Qty: 60 | Refills: 0
Start: 2025-03-19 | End: 2025-04-17

## 2025-03-19 RX ORDER — TRAMADOL HYDROCHLORIDE 50 MG/1
1 TABLET, FILM COATED ORAL
Qty: 28 | Refills: 0
Start: 2025-03-19 | End: 2025-03-25

## 2025-03-19 RX ORDER — ACETAMINOPHEN 500 MG/5ML
3 LIQUID (ML) ORAL
Qty: 0 | Refills: 0 | DISCHARGE
Start: 2025-03-19

## 2025-03-19 RX ORDER — ACETAMINOPHEN 500 MG/5ML
3 LIQUID (ML) ORAL
Qty: 63 | Refills: 0
Start: 2025-03-19 | End: 2025-03-25

## 2025-03-19 RX ORDER — TRAMADOL HYDROCHLORIDE 50 MG/1
1 TABLET, FILM COATED ORAL
Qty: 0 | Refills: 0 | DISCHARGE
Start: 2025-03-19

## 2025-03-19 RX ORDER — ASPIRIN 325 MG
1 TABLET ORAL
Qty: 84 | Refills: 0
Start: 2025-03-19 | End: 2025-04-29

## 2025-03-19 RX ORDER — SENNA 187 MG
2 TABLET ORAL
Qty: 0 | Refills: 0 | DISCHARGE
Start: 2025-03-19

## 2025-03-19 RX ORDER — DICLOFENAC SODIUM 75 MG/1
1 TABLET, DELAYED RELEASE ORAL
Refills: 0 | DISCHARGE

## 2025-03-19 RX ORDER — ASPIRIN 325 MG
1 TABLET ORAL
Qty: 0 | Refills: 0 | DISCHARGE
Start: 2025-03-19

## 2025-03-19 RX ORDER — OXYCODONE HYDROCHLORIDE 30 MG/1
1 TABLET ORAL
Qty: 42 | Refills: 0
Start: 2025-03-19 | End: 2025-03-25

## 2025-03-19 RX ADMIN — KETOROLAC TROMETHAMINE 30 MILLIGRAM(S): 30 INJECTION, SOLUTION INTRAMUSCULAR; INTRAVENOUS at 02:34

## 2025-03-19 RX ADMIN — KETOROLAC TROMETHAMINE 30 MILLIGRAM(S): 30 INJECTION, SOLUTION INTRAMUSCULAR; INTRAVENOUS at 01:34

## 2025-03-19 RX ADMIN — Medication 400 MILLIGRAM(S): at 04:55

## 2025-03-19 RX ADMIN — KETOROLAC TROMETHAMINE 30 MILLIGRAM(S): 30 INJECTION, SOLUTION INTRAMUSCULAR; INTRAVENOUS at 09:20

## 2025-03-19 RX ADMIN — Medication 100 MILLIGRAM(S): at 05:15

## 2025-03-19 RX ADMIN — KETOROLAC TROMETHAMINE 30 MILLIGRAM(S): 30 INJECTION, SOLUTION INTRAMUSCULAR; INTRAVENOUS at 15:13

## 2025-03-19 RX ADMIN — Medication 40 MILLIGRAM(S): at 05:16

## 2025-03-19 RX ADMIN — KETOROLAC TROMETHAMINE 30 MILLIGRAM(S): 30 INJECTION, SOLUTION INTRAMUSCULAR; INTRAVENOUS at 08:26

## 2025-03-19 RX ADMIN — Medication 500 MILLILITER(S): at 05:16

## 2025-03-19 RX ADMIN — KETOROLAC TROMETHAMINE 30 MILLIGRAM(S): 30 INJECTION, SOLUTION INTRAMUSCULAR; INTRAVENOUS at 14:24

## 2025-03-19 RX ADMIN — Medication 1000 MILLIGRAM(S): at 13:00

## 2025-03-19 RX ADMIN — INSULIN LISPRO 3: 100 INJECTION, SOLUTION INTRAVENOUS; SUBCUTANEOUS at 12:18

## 2025-03-19 RX ADMIN — INSULIN LISPRO 1: 100 INJECTION, SOLUTION INTRAVENOUS; SUBCUTANEOUS at 08:28

## 2025-03-19 RX ADMIN — Medication 400 MILLIGRAM(S): at 12:20

## 2025-03-19 RX ADMIN — Medication 81 MILLIGRAM(S): at 05:16

## 2025-03-19 NOTE — PROGRESS NOTE ADULT - SUBJECTIVE AND OBJECTIVE BOX
SUBJECTIVE  No acute events overnight. Pain controlled.    OBJECTIVE  Vital Signs Last 24 Hrs  T(C): 36.7 (19 Mar 2025 04:53), Max: 37.3 (18 Mar 2025 19:46)  T(F): 98.1 (19 Mar 2025 04:53), Max: 99.1 (18 Mar 2025 19:46)  HR: 81 (19 Mar 2025 05:24) (77 - 109)  BP: 131/81 (19 Mar 2025 04:53) (118/72 - 179/93)  BP(mean): 94 (18 Mar 2025 18:00) (94 - 128)  RR: 18 (19 Mar 2025 04:53) (14 - 18)  SpO2: 96% (19 Mar 2025 05:24) (95% - 100%)  Parameters below as of 19 Mar 2025 04:53  Patient On (Oxygen Delivery Method): room air    PHYSICAL EXAM  Gen: Lying in bed, NAD  Resp: No increased WOB  RLE:  Dressing c/d/i, compartments soft, no calf TTP b/l  Motor: TA/EHL/GS/FHL intact  Sensory: DP/SP/Tib/Juan/Saph SILT  +DP pulse, WWP    LABS  AM labs pending    ASSESSMENT & PLAN  64yMale s/p R TKA on 3/18/25.  -WBAT RLE  -pain control  -ice/cold compress, elevation  -incentive spirometry  -DVT ppx: ASA 81mg BID  -PT/OT  -dispo: home

## 2025-03-19 NOTE — DISCHARGE NOTE NURSING/CASE MANAGEMENT/SOCIAL WORK - NSDCVIVACCINE_GEN_ALL_CORE_FT
Tdap; 12-Jun-2023 17:13; Latanya Rocha); [employer]; [id number] (Exp. Date: 22-Feb-2025); IntraMuscular; Deltoid Right.; 0.5 milliLiter(s); VIS (VIS Published: 09-May-2013, VIS Presented: 12-Jun-2023);

## 2025-03-19 NOTE — CONSULT NOTE ADULT - SUBJECTIVE AND OBJECTIVE BOX
CHIEF COMPLAINT: s/p knee surgery    HISTORY OF PRESENT ILLNESS:  63 year old male with pmhx LUZMA on CPAP, DM, OA, Patella fracture. PShx multiple Lumbar spine procedures including lumbar fusion and discectomy.  C/o right pain 8/10 for the past year that has worsen overtime. The pain is exacerbated with knee flexion, stairs and prolonged weight bearing. Pain is better with rest. His last right knee cortisone injection was on 12/13/24 and right knee Monovisc injection on 12/23/24. s/p Right Total Knee Arthroplasty with [employer] Robot with Dr. Aragon on 3/18/25.  denies any cp/sob/palps/dizziness      Allergies    No Known Allergies    Intolerances    	    MEDICATIONS:  aspirin enteric coated 81 milliGRAM(s) Oral two times a day        acetaminophen     Tablet .. 975 milliGRAM(s) Oral every 8 hours  acetaminophen   IVPB .. 1000 milliGRAM(s) IV Intermittent once  celecoxib 200 milliGRAM(s) Oral every 12 hours  HYDROmorphone  Injectable 0.5 milliGRAM(s) IV Push once  ketorolac   Injectable 30 milliGRAM(s) IV Push every 6 hours  ondansetron Injectable 4 milliGRAM(s) IV Push every 6 hours PRN  oxyCODONE    IR 5 milliGRAM(s) Oral every 4 hours PRN  oxyCODONE    IR 10 milliGRAM(s) Oral every 4 hours PRN  traMADol 50 milliGRAM(s) Oral every 6 hours PRN    magnesium hydroxide Suspension 30 milliLiter(s) Oral daily PRN  pantoprazole    Tablet 40 milliGRAM(s) Oral before breakfast  polyethylene glycol 3350 17 Gram(s) Oral at bedtime  senna 2 Tablet(s) Oral at bedtime    dextrose 50% Injectable 25 Gram(s) IV Push once  dextrose 50% Injectable 12.5 Gram(s) IV Push once  dextrose 50% Injectable 25 Gram(s) IV Push once  dextrose Oral Gel 15 Gram(s) Oral once PRN  glucagon  Injectable 1 milliGRAM(s) IntraMuscular once  insulin lispro (ADMELOG) corrective regimen sliding scale   SubCutaneous three times a day before meals  insulin lispro (ADMELOG) corrective regimen sliding scale   SubCutaneous at bedtime    dextrose 5%. 1000 milliLiter(s) IV Continuous <Continuous>  dextrose 5%. 1000 milliLiter(s) IV Continuous <Continuous>  influenza  Vaccine (HIGH DOSE) 0.5 milliLiter(s) IntraMuscular once      PAST MEDICAL & SURGICAL HISTORY:  DM (diabetes mellitus)      OA (osteoarthritis)      LUZMA on CPAP      Patella fracture      H/O spinal fusion  lumbar spinal fusion  spinal fixation device  discectomy      H/O discectomy          FAMILY HISTORY:      SOCIAL HISTORY:    non smoker. indep in adl    REVIEW OF SYSTEMS:  See HPI, otherwise complete 10 point review of systems negative    [ ] All others negative	      PHYSICAL EXAM:  T(C): 36.7 (03-19-25 @ 04:53), Max: 37.3 (03-18-25 @ 19:46)  HR: 81 (03-19-25 @ 05:24) (77 - 109)  BP: 131/81 (03-19-25 @ 04:53) (118/72 - 179/93)  RR: 18 (03-19-25 @ 04:53) (14 - 18)  SpO2: 96% (03-19-25 @ 05:24) (95% - 100%)  Wt(kg): --  I&O's Summary    18 Mar 2025 07:01  -  19 Mar 2025 07:00  --------------------------------------------------------  IN: 1000 mL / OUT: 775 mL / NET: 225 mL        Appearance: No Acute Distress	  HEENT:  Normal oral mucosa, PERRL, EOMI	  Cardiovascular: Normal S1 S2, No JVD, No murmurs/rubs/gallops  Respiratory: Lungs clear to auscultation bilaterally  Gastrointestinal:  Soft, Non-tender, + BS	  Skin: No rashes, No ecchymoses, No cyanosis	  Neurologic: Non-focal  Extremities: No clubbing, cyanosis or edema  Vascular: Peripheral pulses palpable 2+ bilaterally  Psychiatry: A & O x 3, Mood & affect appropriate    Laboratory Data:	 	    CBC Full  -  ( 19 Mar 2025 07:03 )  WBC Count : 12.21 K/uL  Hemoglobin : 10.0 g/dL  Hematocrit : 28.9 %  Platelet Count - Automated : 223 K/uL  Mean Cell Volume : 85.8 fl  Mean Cell Hemoglobin : 29.7 pg  Mean Cell Hemoglobin Concentration : 34.6 g/dL  Auto Neutrophil # : x  Auto Lymphocyte # : x  Auto Monocyte # : x  Auto Eosinophil # : x  Auto Basophil # : x  Auto Neutrophil % : x  Auto Lymphocyte % : x  Auto Monocyte % : x  Auto Eosinophil % : x  Auto Basophil % : x            proBNP:   Lipid Profile:   HgA1c:   TSH:       CARDIAC MARKERS:            Interpretation of Telemetry: 	    ECG:  	  RADIOLOGY:  OTHER: 	    PREVIOUS DIAGNOSTIC TESTING:    [ ] Echocardiogram:  [ ] Catheterization:  [ ] Stress Test:  	    Assessment:  63 year old male with pmhx LUZMA on CPAP, DM, OA,  s/p Right Total Knee Arthroplasty with Cedric Robot with Dr. Aragon on 3/18/25.    Recs:  cardiac stable  no postop cardiac events noted  care per ortho  pain control  pt eval   dvt ppx          Greater than 60 minutes spent on total encounter; more than 50% of the visit was spent counseling and/or coordinating care by the attending physician.   	  Yariel Fermin MD   Cardiovascular Diseases  [phone]

## 2025-03-19 NOTE — DISCHARGE NOTE NURSING/CASE MANAGEMENT/SOCIAL WORK - PATIENT PORTAL LINK FT
You can access the FollowMyHealth Patient Portal offered by [facility] by registering at the following website: [email or web address]. By joining [employer]’s FollowMyHealth portal, you will also be able to view your health information using other applications (apps) compatible with our system.

## 2025-03-19 NOTE — DISCHARGE NOTE NURSING/CASE MANAGEMENT/SOCIAL WORK - FINANCIAL ASSISTANCE
[facility] provides services at a reduced cost to those who are determined to be eligible through [facility]’s financial assistance program. Information regarding [facility]’s financial assistance program can be found by going to [email or web address] or by calling [phone].

## 2025-03-19 NOTE — OCCUPATIONAL THERAPY INITIAL EVALUATION ADULT - ADDITIONAL COMMENTS
Pt lives w/wife in [place], +10STE B/L HR, 5steps to second floor and down to basement, walk in shower. PTA pt was indep in ADL/amb. Owns RW and shower chair.

## 2025-03-21 PROBLEM — E11.9 TYPE 2 DIABETES MELLITUS WITHOUT COMPLICATIONS: Chronic | Status: ACTIVE | Noted: 2025-02-26

## 2025-03-25 ENCOUNTER — NON-APPOINTMENT (OUTPATIENT)
Age: 65
End: 2025-03-25

## 2025-03-26 PROBLEM — Z96.651 STATUS POST TOTAL RIGHT KNEE REPLACEMENT: Status: ACTIVE | Noted: 2025-03-26

## 2025-03-26 RX ORDER — TRAMADOL HYDROCHLORIDE 50 MG/1
50 TABLET, COATED ORAL
Qty: 21 | Refills: 0 | Status: ACTIVE | COMMUNITY
Start: 2025-03-26 | End: 1900-01-01

## 2025-03-26 RX ORDER — ACETAMINOPHEN 325 MG/1
325 TABLET ORAL EVERY 8 HOURS
Qty: 126 | Refills: 0 | Status: ACTIVE | COMMUNITY
Start: 2025-03-26 | End: 1900-01-01

## 2025-04-02 ENCOUNTER — NON-APPOINTMENT (OUTPATIENT)
Age: 65
End: 2025-04-02

## 2025-04-03 ENCOUNTER — APPOINTMENT (OUTPATIENT)
Dept: ORTHOPEDIC SURGERY | Facility: CLINIC | Age: 65
End: 2025-04-03
Payer: MEDICARE

## 2025-04-03 ENCOUNTER — NON-APPOINTMENT (OUTPATIENT)
Age: 65
End: 2025-04-03

## 2025-04-03 DIAGNOSIS — Z96.651 PRESENCE OF RIGHT ARTIFICIAL KNEE JOINT: ICD-10-CM

## 2025-04-03 PROBLEM — M19.90 UNSPECIFIED OSTEOARTHRITIS, UNSPECIFIED SITE: Chronic | Status: ACTIVE | Noted: 2025-02-26

## 2025-04-03 PROBLEM — G47.33 OBSTRUCTIVE SLEEP APNEA (ADULT) (PEDIATRIC): Chronic | Status: ACTIVE | Noted: 2025-02-26

## 2025-04-03 PROCEDURE — 99024 POSTOP FOLLOW-UP VISIT: CPT

## 2025-04-03 PROCEDURE — 73562 X-RAY EXAM OF KNEE 3: CPT | Mod: RT

## 2025-04-03 RX ORDER — NAPROXEN 500 MG/1
500 TABLET ORAL
Qty: 40 | Refills: 0 | Status: ACTIVE | COMMUNITY
Start: 2025-04-03 | End: 1900-01-01

## 2025-04-10 NOTE — PHYSICAL THERAPY INITIAL EVALUATION ADULT - ASR WT BEARING STATUS EVAL
Prior to immunization administration, verified patients identity using patient s name and date of birth. Please see Immunization Activity for additional information.     Screening Questionnaire for Adult Immunization    Are you sick today?   No   Do you have allergies to medications, food, a vaccine component or latex?   No   Have you ever had a serious reaction after receiving a vaccination?   No   Do you have a long-term health problem with heart, lung, kidney, or metabolic disease (e.g., diabetes), asthma, a blood disorder, no spleen, complement component deficiency, a cochlear implant, or a spinal fluid leak?  Are you on long-term aspirin therapy?   No   Do you have cancer, leukemia, HIV/AIDS, or any other immune system problem?   No   Do you have a parent, brother, or sister with an immune system problem?   No   In the past 3 months, have you taken medications that affect  your immune system, such as prednisone, other steroids, or anticancer drugs; drugs for the treatment of rheumatoid arthritis, Crohn s disease, or psoriasis; or have you had radiation treatments?   No   Have you had a seizure, or a brain or other nervous system problem?   No   During the past year, have you received a transfusion of blood or blood    products, or been given immune (gamma) globulin or antiviral drug?   No   For women: Are you pregnant or is there a chance you could become       pregnant during the next month?   No   Have you received any vaccinations in the past 4 weeks?   No     Immunization questionnaire answers were all negative.      Patient instructed to remain in clinic for 15 minutes afterwards, and to report any adverse reactions.     Screening performed by Luisa Rivers MA on 4/10/2025 at 2:56 PM.     Right LE

## 2025-04-22 ENCOUNTER — NON-APPOINTMENT (OUTPATIENT)
Age: 65
End: 2025-04-22

## 2025-05-05 ENCOUNTER — APPOINTMENT (OUTPATIENT)
Dept: PULMONOLOGY | Facility: CLINIC | Age: 65
End: 2025-05-05
Payer: MEDICARE

## 2025-05-05 VITALS
RESPIRATION RATE: 16 BRPM | SYSTOLIC BLOOD PRESSURE: 135 MMHG | BODY MASS INDEX: 32.5 KG/M2 | WEIGHT: 233 LBS | HEART RATE: 102 BPM | OXYGEN SATURATION: 95 % | DIASTOLIC BLOOD PRESSURE: 81 MMHG

## 2025-05-05 DIAGNOSIS — G47.33 OBSTRUCTIVE SLEEP APNEA (ADULT) (PEDIATRIC): ICD-10-CM

## 2025-05-05 PROCEDURE — G2211 COMPLEX E/M VISIT ADD ON: CPT

## 2025-05-05 PROCEDURE — 99213 OFFICE O/P EST LOW 20 MIN: CPT

## 2025-05-07 ENCOUNTER — NON-APPOINTMENT (OUTPATIENT)
Age: 65
End: 2025-05-07

## 2025-06-02 ENCOUNTER — NON-APPOINTMENT (OUTPATIENT)
Age: 65
End: 2025-06-02

## 2025-06-16 ENCOUNTER — NON-APPOINTMENT (OUTPATIENT)
Age: 65
End: 2025-06-16

## 2025-07-03 ENCOUNTER — APPOINTMENT (OUTPATIENT)
Dept: ORTHOPEDIC SURGERY | Facility: CLINIC | Age: 65
End: 2025-07-03
Payer: MEDICARE

## 2025-07-03 VITALS — HEIGHT: 71 IN | WEIGHT: 244 LBS | BODY MASS INDEX: 34.16 KG/M2

## 2025-07-03 PROCEDURE — 99213 OFFICE O/P EST LOW 20 MIN: CPT

## 2025-07-03 PROCEDURE — 73562 X-RAY EXAM OF KNEE 3: CPT | Mod: RT

## 2025-07-21 ENCOUNTER — NON-APPOINTMENT (OUTPATIENT)
Age: 65
End: 2025-07-21

## 2025-07-29 ENCOUNTER — NON-APPOINTMENT (OUTPATIENT)
Age: 65
End: 2025-07-29

## 2025-08-30 ENCOUNTER — NON-APPOINTMENT (OUTPATIENT)
Age: 65
End: 2025-08-30

## (undated) DEVICE — HOOD T7 NON-PEELAWAY

## (undated) DEVICE — TOURNIQUET CUFF 34" DUAL PORT W PLC

## (undated) DEVICE — NDL HYPO SAFE 22G X 1.5" (BLACK)

## (undated) DEVICE — ELCTR PLASMA BLADE X 3.0S WIDE TIP

## (undated) DEVICE — SUT PDO 2 1/2 CIRCLE 40MM NDL 45CM

## (undated) DEVICE — SPECIMEN CONTAINER 100ML

## (undated) DEVICE — DRSG AQUACEL 3.5 X 10"

## (undated) DEVICE — SAW BLADE STRYKER SAGITTAL DUAL CUT 64X35X.89MM

## (undated) DEVICE — SOL IRR POUR NS 0.9% 500ML

## (undated) DEVICE — POSITIONER CARDIAC BUMP

## (undated) DEVICE — SUT POLYSORB 2-0 30" GS-21 UNDYED

## (undated) DEVICE — WARMING BLANKET UPPER ADULT

## (undated) DEVICE — WOUND IRR IRRISEPT W 0.5 CHG

## (undated) DEVICE — MAKO BLADE STANDARD

## (undated) DEVICE — MAKO VIZADISC KNEE TRACKING KIT

## (undated) DEVICE — SUT QUILL PDO 0 45CM 36MM

## (undated) DEVICE — VENODYNE/SCD SLEEVE CALF MEDIUM

## (undated) DEVICE — POSITIONER FOAM EGG CRATE ULNAR 2PCS (PINK)

## (undated) DEVICE — SOL IRR POUR H2O 1000ML

## (undated) DEVICE — DRAPE 3/4 SHEET W REINFORCEMENT 56X77"

## (undated) DEVICE — DRSG DERMABOND 0.7ML

## (undated) DEVICE — GLV 8 PROTEXIS (WHITE)

## (undated) DEVICE — PACK TOTAL KNEE (2 PACKS)

## (undated) DEVICE — SUT QUILL MONODERM 2-0 3/8 CIRCLE 45CM

## (undated) DEVICE — POSITIONER FOAM HEADREST (PINK)

## (undated) DEVICE — SYR LUER LOK 20CC

## (undated) DEVICE — TUBING SUCTION 20FT

## (undated) DEVICE — MAKO DRAPE KIT

## (undated) DEVICE — SUT POLYSORB 1-0 36" GS-21 UNDYED

## (undated) DEVICE — GLV 8.5 PROTEXIS (WHITE)